# Patient Record
Sex: FEMALE | Race: WHITE
[De-identification: names, ages, dates, MRNs, and addresses within clinical notes are randomized per-mention and may not be internally consistent; named-entity substitution may affect disease eponyms.]

---

## 2018-12-14 ENCOUNTER — HOSPITAL ENCOUNTER (INPATIENT)
Dept: HOSPITAL 8 - ED | Age: 43
LOS: 14 days | Discharge: HOME | DRG: 468 | End: 2018-12-28
Attending: EMERGENCY MEDICINE | Admitting: FAMILY MEDICINE
Payer: COMMERCIAL

## 2018-12-14 VITALS — WEIGHT: 238.1 LBS | HEIGHT: 66 IN | BODY MASS INDEX: 38.27 KG/M2

## 2018-12-14 DIAGNOSIS — M16.11: ICD-10-CM

## 2018-12-14 DIAGNOSIS — Z88.6: ICD-10-CM

## 2018-12-14 DIAGNOSIS — B96.89: ICD-10-CM

## 2018-12-14 DIAGNOSIS — D47.3: ICD-10-CM

## 2018-12-14 DIAGNOSIS — N18.9: ICD-10-CM

## 2018-12-14 DIAGNOSIS — Z88.5: ICD-10-CM

## 2018-12-14 DIAGNOSIS — T84.020A: Primary | ICD-10-CM

## 2018-12-14 DIAGNOSIS — G89.4: ICD-10-CM

## 2018-12-14 DIAGNOSIS — D50.9: ICD-10-CM

## 2018-12-14 DIAGNOSIS — D63.1: ICD-10-CM
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
## 2018-12-14 DIAGNOSIS — Z79.891: ICD-10-CM

## 2018-12-14 DIAGNOSIS — Z88.8: ICD-10-CM
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
## 2018-12-14 DIAGNOSIS — E66.01: ICD-10-CM
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
## 2018-12-14 PROCEDURE — 84443 ASSAY THYROID STIM HORMONE: CPT

## 2018-12-14 PROCEDURE — 76001: CPT

## 2018-12-14 PROCEDURE — C1776 JOINT DEVICE (IMPLANTABLE): HCPCS

## 2018-12-14 PROCEDURE — 36415 COLL VENOUS BLD VENIPUNCTURE: CPT

## 2018-12-14 PROCEDURE — 83550 IRON BINDING TEST: CPT

## 2018-12-14 PROCEDURE — 86923 COMPATIBILITY TEST ELECTRIC: CPT

## 2018-12-14 PROCEDURE — 80053 COMPREHEN METABOLIC PANEL: CPT

## 2018-12-14 PROCEDURE — 85025 COMPLETE CBC W/AUTO DIFF WBC: CPT

## 2018-12-14 PROCEDURE — 83615 LACTATE (LD) (LDH) ENZYME: CPT

## 2018-12-14 PROCEDURE — 82607 VITAMIN B-12: CPT

## 2018-12-14 PROCEDURE — 83540 ASSAY OF IRON: CPT

## 2018-12-14 PROCEDURE — 87205 SMEAR GRAM STAIN: CPT

## 2018-12-14 PROCEDURE — 86900 BLOOD TYPING SEROLOGIC ABO: CPT

## 2018-12-14 PROCEDURE — 80307 DRUG TEST PRSMV CHEM ANLYZR: CPT

## 2018-12-14 PROCEDURE — 73501 X-RAY EXAM HIP UNI 1 VIEW: CPT

## 2018-12-14 PROCEDURE — 82272 OCCULT BLD FECES 1-3 TESTS: CPT

## 2018-12-14 PROCEDURE — 99285 EMERGENCY DEPT VISIT HI MDM: CPT

## 2018-12-14 PROCEDURE — 72170 X-RAY EXAM OF PELVIS: CPT

## 2018-12-14 PROCEDURE — 82728 ASSAY OF FERRITIN: CPT

## 2018-12-14 PROCEDURE — 0SW9XJZ REVISION OF SYNTHETIC SUBSTITUTE IN RIGHT HIP JOINT, EXTERNAL APPROACH: ICD-10-PCS | Performed by: EMERGENCY MEDICINE

## 2018-12-14 PROCEDURE — 76000 FLUOROSCOPY <1 HR PHYS/QHP: CPT

## 2018-12-14 PROCEDURE — 82746 ASSAY OF FOLIC ACID SERUM: CPT

## 2018-12-14 PROCEDURE — 86850 RBC ANTIBODY SCREEN: CPT

## 2018-12-14 PROCEDURE — P9016 RBC LEUKOCYTES REDUCED: HCPCS

## 2018-12-14 PROCEDURE — 85610 PROTHROMBIN TIME: CPT

## 2018-12-14 PROCEDURE — 80048 BASIC METABOLIC PNL TOTAL CA: CPT

## 2018-12-14 PROCEDURE — 83735 ASSAY OF MAGNESIUM: CPT

## 2018-12-14 PROCEDURE — 87070 CULTURE OTHR SPECIMN AEROBIC: CPT

## 2018-12-14 PROCEDURE — 87176 TISSUE HOMOGENIZATION CULTR: CPT

## 2018-12-14 PROCEDURE — C1713 ANCHOR/SCREW BN/BN,TIS/BN: HCPCS

## 2018-12-14 PROCEDURE — 81025 URINE PREGNANCY TEST: CPT

## 2018-12-14 PROCEDURE — 87075 CULTR BACTERIA EXCEPT BLOOD: CPT

## 2018-12-14 PROCEDURE — 27250 TREAT HIP DISLOCATION: CPT

## 2018-12-14 PROCEDURE — 73502 X-RAY EXAM HIP UNI 2-3 VIEWS: CPT

## 2018-12-14 PROCEDURE — 82330 ASSAY OF CALCIUM: CPT

## 2018-12-14 PROCEDURE — G0378 HOSPITAL OBSERVATION PER HR: HCPCS

## 2018-12-14 PROCEDURE — 84702 CHORIONIC GONADOTROPIN TEST: CPT

## 2018-12-15 VITALS — SYSTOLIC BLOOD PRESSURE: 103 MMHG | DIASTOLIC BLOOD PRESSURE: 68 MMHG

## 2018-12-15 VITALS — DIASTOLIC BLOOD PRESSURE: 72 MMHG | SYSTOLIC BLOOD PRESSURE: 123 MMHG

## 2018-12-15 VITALS — SYSTOLIC BLOOD PRESSURE: 103 MMHG | DIASTOLIC BLOOD PRESSURE: 67 MMHG

## 2018-12-15 VITALS — DIASTOLIC BLOOD PRESSURE: 70 MMHG | SYSTOLIC BLOOD PRESSURE: 106 MMHG

## 2018-12-15 RX ADMIN — METHOCARBAMOL PRN MG: 500 TABLET ORAL at 22:41

## 2018-12-15 RX ADMIN — OXYCODONE HYDROCHLORIDE PRN MG: 5 TABLET ORAL at 16:48

## 2018-12-15 RX ADMIN — OXYCODONE HYDROCHLORIDE PRN MG: 5 TABLET ORAL at 04:34

## 2018-12-15 RX ADMIN — OXYCODONE HYDROCHLORIDE PRN MG: 5 TABLET ORAL at 10:51

## 2018-12-15 RX ADMIN — METHOCARBAMOL PRN MG: 500 TABLET ORAL at 16:49

## 2018-12-15 RX ADMIN — ENOXAPARIN SODIUM SCH MG: 40 INJECTION SUBCUTANEOUS at 04:34

## 2018-12-15 RX ADMIN — ASPIRIN SCH MG: 81 TABLET, COATED ORAL at 07:58

## 2018-12-15 RX ADMIN — METHOCARBAMOL PRN MG: 500 TABLET ORAL at 10:51

## 2018-12-15 RX ADMIN — SODIUM CHLORIDE, PRESERVATIVE FREE SCH ML: 5 INJECTION INTRAVENOUS at 20:58

## 2018-12-15 RX ADMIN — DOCUSATE SODIUM 50MG AND SENNOSIDES 8.6MG SCH TAB: 8.6; 5 TABLET, FILM COATED ORAL at 07:58

## 2018-12-15 RX ADMIN — METHOCARBAMOL PRN MG: 500 TABLET ORAL at 04:34

## 2018-12-15 RX ADMIN — CARISOPRODOL SCH MG: 350 TABLET ORAL at 07:58

## 2018-12-15 RX ADMIN — SODIUM CHLORIDE, PRESERVATIVE FREE SCH ML: 5 INJECTION INTRAVENOUS at 07:59

## 2018-12-15 RX ADMIN — DICLOFENAC SODIUM SCH MG: 50 TABLET, DELAYED RELEASE ORAL at 07:58

## 2018-12-15 RX ADMIN — OXYCODONE HYDROCHLORIDE PRN MG: 5 TABLET ORAL at 22:41

## 2018-12-15 RX ADMIN — LIDOCAINE SCH PATCH: 50 PATCH CUTANEOUS at 20:55

## 2018-12-15 RX ADMIN — ONDANSETRON PRN MG: 2 INJECTION, SOLUTION INTRAMUSCULAR; INTRAVENOUS at 22:46

## 2018-12-16 VITALS — DIASTOLIC BLOOD PRESSURE: 56 MMHG | SYSTOLIC BLOOD PRESSURE: 99 MMHG

## 2018-12-16 VITALS — DIASTOLIC BLOOD PRESSURE: 53 MMHG | SYSTOLIC BLOOD PRESSURE: 98 MMHG

## 2018-12-16 VITALS — DIASTOLIC BLOOD PRESSURE: 67 MMHG | SYSTOLIC BLOOD PRESSURE: 101 MMHG

## 2018-12-16 VITALS — DIASTOLIC BLOOD PRESSURE: 79 MMHG | SYSTOLIC BLOOD PRESSURE: 117 MMHG

## 2018-12-16 RX ADMIN — SODIUM CHLORIDE, PRESERVATIVE FREE SCH ML: 5 INJECTION INTRAVENOUS at 21:01

## 2018-12-16 RX ADMIN — METHOCARBAMOL PRN MG: 500 TABLET ORAL at 10:38

## 2018-12-16 RX ADMIN — ONDANSETRON PRN MG: 2 INJECTION, SOLUTION INTRAMUSCULAR; INTRAVENOUS at 16:27

## 2018-12-16 RX ADMIN — GABAPENTIN SCH MG: 100 CAPSULE ORAL at 16:27

## 2018-12-16 RX ADMIN — ASPIRIN SCH MG: 81 TABLET, COATED ORAL at 08:02

## 2018-12-16 RX ADMIN — CARISOPRODOL SCH MG: 350 TABLET ORAL at 09:00

## 2018-12-16 RX ADMIN — ONDANSETRON PRN MG: 2 INJECTION, SOLUTION INTRAMUSCULAR; INTRAVENOUS at 21:01

## 2018-12-16 RX ADMIN — ONDANSETRON PRN MG: 2 INJECTION, SOLUTION INTRAMUSCULAR; INTRAVENOUS at 09:20

## 2018-12-16 RX ADMIN — CARISOPRODOL SCH MG: 350 TABLET ORAL at 21:01

## 2018-12-16 RX ADMIN — METHOCARBAMOL PRN MG: 500 TABLET ORAL at 04:37

## 2018-12-16 RX ADMIN — POLYETHYLENE GLYCOL 3350 PRN GM: 17 POWDER, FOR SOLUTION ORAL at 09:35

## 2018-12-16 RX ADMIN — DICLOFENAC SODIUM SCH MG: 50 TABLET, DELAYED RELEASE ORAL at 08:02

## 2018-12-16 RX ADMIN — METHOCARBAMOL PRN MG: 500 TABLET ORAL at 14:12

## 2018-12-16 RX ADMIN — DOCUSATE SODIUM 50MG AND SENNOSIDES 8.6MG SCH TAB: 8.6; 5 TABLET, FILM COATED ORAL at 08:02

## 2018-12-16 RX ADMIN — OXYCODONE HYDROCHLORIDE PRN MG: 5 TABLET ORAL at 04:37

## 2018-12-16 RX ADMIN — ENOXAPARIN SODIUM SCH MG: 40 INJECTION SUBCUTANEOUS at 04:38

## 2018-12-16 RX ADMIN — DICLOFENAC SODIUM SCH MG: 75 TABLET, DELAYED RELEASE ORAL at 09:00

## 2018-12-16 RX ADMIN — GABAPENTIN SCH MG: 100 CAPSULE ORAL at 09:20

## 2018-12-16 RX ADMIN — SODIUM CHLORIDE, PRESERVATIVE FREE SCH ML: 5 INJECTION INTRAVENOUS at 09:00

## 2018-12-16 RX ADMIN — CARISOPRODOL SCH MG: 350 TABLET ORAL at 08:02

## 2018-12-16 RX ADMIN — DICLOFENAC SODIUM SCH MG: 75 TABLET, DELAYED RELEASE ORAL at 21:00

## 2018-12-16 RX ADMIN — OXYCODONE HYDROCHLORIDE PRN MG: 5 TABLET ORAL at 10:38

## 2018-12-16 RX ADMIN — GABAPENTIN SCH MG: 100 CAPSULE ORAL at 21:00

## 2018-12-16 RX ADMIN — OXYCODONE HYDROCHLORIDE PRN MG: 5 TABLET ORAL at 22:53

## 2018-12-16 RX ADMIN — OXYCODONE HYDROCHLORIDE PRN MG: 5 TABLET ORAL at 16:52

## 2018-12-16 RX ADMIN — LIDOCAINE SCH PATCH: 50 PATCH CUTANEOUS at 09:30

## 2018-12-17 VITALS — DIASTOLIC BLOOD PRESSURE: 54 MMHG | SYSTOLIC BLOOD PRESSURE: 104 MMHG

## 2018-12-17 VITALS — SYSTOLIC BLOOD PRESSURE: 90 MMHG | DIASTOLIC BLOOD PRESSURE: 57 MMHG

## 2018-12-17 VITALS — DIASTOLIC BLOOD PRESSURE: 52 MMHG | SYSTOLIC BLOOD PRESSURE: 97 MMHG

## 2018-12-17 VITALS — DIASTOLIC BLOOD PRESSURE: 47 MMHG | SYSTOLIC BLOOD PRESSURE: 100 MMHG

## 2018-12-17 RX ADMIN — METHOCARBAMOL PRN MG: 500 TABLET ORAL at 00:09

## 2018-12-17 RX ADMIN — OXYCODONE HYDROCHLORIDE PRN MG: 5 TABLET ORAL at 04:44

## 2018-12-17 RX ADMIN — DICLOFENAC SODIUM SCH MG: 75 TABLET, DELAYED RELEASE ORAL at 09:12

## 2018-12-17 RX ADMIN — ONDANSETRON PRN MG: 2 INJECTION, SOLUTION INTRAMUSCULAR; INTRAVENOUS at 16:45

## 2018-12-17 RX ADMIN — DICLOFENAC SODIUM SCH MG: 75 TABLET, DELAYED RELEASE ORAL at 20:49

## 2018-12-17 RX ADMIN — ENOXAPARIN SODIUM SCH MG: 40 INJECTION SUBCUTANEOUS at 04:44

## 2018-12-17 RX ADMIN — ONDANSETRON PRN MG: 2 INJECTION, SOLUTION INTRAMUSCULAR; INTRAVENOUS at 09:12

## 2018-12-17 RX ADMIN — DOCUSATE SODIUM 50MG AND SENNOSIDES 8.6MG SCH TAB: 8.6; 5 TABLET, FILM COATED ORAL at 09:13

## 2018-12-17 RX ADMIN — GABAPENTIN SCH MG: 100 CAPSULE ORAL at 16:46

## 2018-12-17 RX ADMIN — ASPIRIN SCH MG: 81 TABLET, COATED ORAL at 09:13

## 2018-12-17 RX ADMIN — SODIUM CHLORIDE, PRESERVATIVE FREE SCH ML: 5 INJECTION INTRAVENOUS at 20:49

## 2018-12-17 RX ADMIN — POLYETHYLENE GLYCOL 3350 PRN GM: 17 POWDER, FOR SOLUTION ORAL at 09:13

## 2018-12-17 RX ADMIN — LIDOCAINE SCH PATCH: 50 PATCH CUTANEOUS at 09:55

## 2018-12-17 RX ADMIN — OXYCODONE HYDROCHLORIDE PRN MG: 5 TABLET ORAL at 14:19

## 2018-12-17 RX ADMIN — ONDANSETRON PRN MG: 2 INJECTION, SOLUTION INTRAMUSCULAR; INTRAVENOUS at 23:16

## 2018-12-17 RX ADMIN — GABAPENTIN SCH MG: 100 CAPSULE ORAL at 09:12

## 2018-12-17 RX ADMIN — OXYCODONE HYDROCHLORIDE PRN MG: 5 TABLET ORAL at 18:13

## 2018-12-17 RX ADMIN — GABAPENTIN SCH MG: 100 CAPSULE ORAL at 20:49

## 2018-12-17 RX ADMIN — OXYCODONE HYDROCHLORIDE PRN MG: 5 TABLET ORAL at 10:18

## 2018-12-17 RX ADMIN — OXYCODONE HYDROCHLORIDE PRN MG: 5 TABLET ORAL at 22:26

## 2018-12-17 RX ADMIN — METHOCARBAMOL PRN MG: 500 TABLET ORAL at 23:16

## 2018-12-17 RX ADMIN — METHOCARBAMOL PRN MG: 500 TABLET ORAL at 06:01

## 2018-12-17 RX ADMIN — CARISOPRODOL SCH MG: 350 TABLET ORAL at 20:58

## 2018-12-17 RX ADMIN — METHOCARBAMOL PRN MG: 500 TABLET ORAL at 16:48

## 2018-12-17 RX ADMIN — SODIUM CHLORIDE, PRESERVATIVE FREE SCH ML: 5 INJECTION INTRAVENOUS at 10:18

## 2018-12-17 RX ADMIN — CARISOPRODOL SCH MG: 350 TABLET ORAL at 09:19

## 2018-12-18 VITALS — SYSTOLIC BLOOD PRESSURE: 92 MMHG | DIASTOLIC BLOOD PRESSURE: 51 MMHG

## 2018-12-18 VITALS — DIASTOLIC BLOOD PRESSURE: 48 MMHG | SYSTOLIC BLOOD PRESSURE: 98 MMHG

## 2018-12-18 VITALS — DIASTOLIC BLOOD PRESSURE: 64 MMHG | SYSTOLIC BLOOD PRESSURE: 103 MMHG

## 2018-12-18 VITALS — DIASTOLIC BLOOD PRESSURE: 51 MMHG | SYSTOLIC BLOOD PRESSURE: 99 MMHG

## 2018-12-18 RX ADMIN — DICLOFENAC SODIUM SCH MG: 75 TABLET, DELAYED RELEASE ORAL at 19:21

## 2018-12-18 RX ADMIN — SODIUM CHLORIDE, PRESERVATIVE FREE SCH ML: 5 INJECTION INTRAVENOUS at 19:22

## 2018-12-18 RX ADMIN — SODIUM CHLORIDE, PRESERVATIVE FREE SCH ML: 5 INJECTION INTRAVENOUS at 09:00

## 2018-12-18 RX ADMIN — ENOXAPARIN SODIUM SCH MG: 40 INJECTION SUBCUTANEOUS at 11:57

## 2018-12-18 RX ADMIN — METHOCARBAMOL PRN MG: 500 TABLET ORAL at 23:28

## 2018-12-18 RX ADMIN — CARISOPRODOL SCH MG: 350 TABLET ORAL at 07:52

## 2018-12-18 RX ADMIN — OXYCODONE HYDROCHLORIDE PRN MG: 5 TABLET ORAL at 02:49

## 2018-12-18 RX ADMIN — ONDANSETRON PRN MG: 2 INJECTION, SOLUTION INTRAMUSCULAR; INTRAVENOUS at 07:53

## 2018-12-18 RX ADMIN — METHOCARBAMOL PRN MG: 500 TABLET ORAL at 05:57

## 2018-12-18 RX ADMIN — GABAPENTIN SCH MG: 100 CAPSULE ORAL at 07:52

## 2018-12-18 RX ADMIN — DICLOFENAC SODIUM SCH MG: 75 TABLET, DELAYED RELEASE ORAL at 07:52

## 2018-12-18 RX ADMIN — OXYCODONE HYDROCHLORIDE PRN MG: 5 TABLET ORAL at 07:34

## 2018-12-18 RX ADMIN — METHOCARBAMOL PRN MG: 500 TABLET ORAL at 13:56

## 2018-12-18 RX ADMIN — OXYCODONE HYDROCHLORIDE PRN MG: 5 TABLET ORAL at 23:28

## 2018-12-18 RX ADMIN — OXYCODONE HYDROCHLORIDE PRN MG: 5 TABLET ORAL at 11:48

## 2018-12-18 RX ADMIN — ONDANSETRON PRN MG: 4 TABLET, ORALLY DISINTEGRATING ORAL at 16:37

## 2018-12-18 RX ADMIN — OXYCODONE HYDROCHLORIDE PRN MG: 5 TABLET ORAL at 15:20

## 2018-12-18 RX ADMIN — OXYCODONE HYDROCHLORIDE PRN MG: 5 TABLET ORAL at 19:21

## 2018-12-18 RX ADMIN — DOCUSATE SODIUM 50MG AND SENNOSIDES 8.6MG SCH TAB: 8.6; 5 TABLET, FILM COATED ORAL at 09:00

## 2018-12-18 RX ADMIN — CARISOPRODOL SCH MG: 350 TABLET ORAL at 19:21

## 2018-12-18 RX ADMIN — GABAPENTIN SCH MG: 100 CAPSULE ORAL at 16:37

## 2018-12-18 RX ADMIN — ASPIRIN SCH MG: 81 TABLET, COATED ORAL at 07:52

## 2018-12-18 RX ADMIN — LIDOCAINE SCH PATCH: 50 PATCH CUTANEOUS at 13:56

## 2018-12-18 RX ADMIN — GABAPENTIN SCH MG: 100 CAPSULE ORAL at 19:21

## 2018-12-19 VITALS — DIASTOLIC BLOOD PRESSURE: 56 MMHG | SYSTOLIC BLOOD PRESSURE: 105 MMHG

## 2018-12-19 VITALS — SYSTOLIC BLOOD PRESSURE: 107 MMHG | DIASTOLIC BLOOD PRESSURE: 68 MMHG

## 2018-12-19 VITALS — DIASTOLIC BLOOD PRESSURE: 62 MMHG | SYSTOLIC BLOOD PRESSURE: 109 MMHG

## 2018-12-19 VITALS — DIASTOLIC BLOOD PRESSURE: 51 MMHG | SYSTOLIC BLOOD PRESSURE: 97 MMHG

## 2018-12-19 VITALS — SYSTOLIC BLOOD PRESSURE: 102 MMHG | DIASTOLIC BLOOD PRESSURE: 51 MMHG

## 2018-12-19 RX ADMIN — ASPIRIN SCH MG: 81 TABLET, COATED ORAL at 07:33

## 2018-12-19 RX ADMIN — SODIUM CHLORIDE, PRESERVATIVE FREE SCH ML: 5 INJECTION INTRAVENOUS at 21:00

## 2018-12-19 RX ADMIN — OXYCODONE HYDROCHLORIDE PRN MG: 5 TABLET ORAL at 03:43

## 2018-12-19 RX ADMIN — METHOCARBAMOL PRN MG: 500 TABLET ORAL at 15:20

## 2018-12-19 RX ADMIN — METHOCARBAMOL PRN MG: 500 TABLET ORAL at 21:12

## 2018-12-19 RX ADMIN — DOCUSATE SODIUM 50MG AND SENNOSIDES 8.6MG SCH TAB: 8.6; 5 TABLET, FILM COATED ORAL at 07:33

## 2018-12-19 RX ADMIN — DICLOFENAC SODIUM SCH MG: 75 TABLET, DELAYED RELEASE ORAL at 07:33

## 2018-12-19 RX ADMIN — OXYCODONE HYDROCHLORIDE PRN MG: 5 TABLET ORAL at 19:26

## 2018-12-19 RX ADMIN — ONDANSETRON PRN MG: 4 TABLET, ORALLY DISINTEGRATING ORAL at 15:56

## 2018-12-19 RX ADMIN — GABAPENTIN SCH MG: 100 CAPSULE ORAL at 15:54

## 2018-12-19 RX ADMIN — CARISOPRODOL SCH MG: 350 TABLET ORAL at 21:05

## 2018-12-19 RX ADMIN — OXYCODONE HYDROCHLORIDE PRN MG: 5 TABLET ORAL at 07:34

## 2018-12-19 RX ADMIN — SODIUM CHLORIDE, PRESERVATIVE FREE SCH ML: 5 INJECTION INTRAVENOUS at 09:38

## 2018-12-19 RX ADMIN — GABAPENTIN SCH MG: 100 CAPSULE ORAL at 06:49

## 2018-12-19 RX ADMIN — ONDANSETRON PRN MG: 4 TABLET, ORALLY DISINTEGRATING ORAL at 06:49

## 2018-12-19 RX ADMIN — LIDOCAINE SCH PATCH: 50 PATCH CUTANEOUS at 21:13

## 2018-12-19 RX ADMIN — OXYCODONE HYDROCHLORIDE PRN MG: 5 TABLET ORAL at 11:16

## 2018-12-19 RX ADMIN — CARISOPRODOL SCH MG: 350 TABLET ORAL at 09:38

## 2018-12-19 RX ADMIN — GABAPENTIN SCH MG: 100 CAPSULE ORAL at 21:05

## 2018-12-19 RX ADMIN — METHOCARBAMOL PRN MG: 500 TABLET ORAL at 03:43

## 2018-12-19 RX ADMIN — OXYCODONE HYDROCHLORIDE PRN MG: 5 TABLET ORAL at 23:37

## 2018-12-19 RX ADMIN — DICLOFENAC SODIUM SCH MG: 75 TABLET, DELAYED RELEASE ORAL at 21:05

## 2018-12-19 RX ADMIN — OXYCODONE HYDROCHLORIDE PRN MG: 5 TABLET ORAL at 15:20

## 2018-12-19 RX ADMIN — ENOXAPARIN SODIUM SCH MG: 40 INJECTION SUBCUTANEOUS at 12:40

## 2018-12-19 RX ADMIN — ONDANSETRON PRN MG: 4 TABLET, ORALLY DISINTEGRATING ORAL at 21:05

## 2018-12-20 VITALS — SYSTOLIC BLOOD PRESSURE: 106 MMHG | DIASTOLIC BLOOD PRESSURE: 62 MMHG

## 2018-12-20 VITALS — DIASTOLIC BLOOD PRESSURE: 66 MMHG | SYSTOLIC BLOOD PRESSURE: 112 MMHG

## 2018-12-20 VITALS — DIASTOLIC BLOOD PRESSURE: 58 MMHG | SYSTOLIC BLOOD PRESSURE: 98 MMHG

## 2018-12-20 VITALS — DIASTOLIC BLOOD PRESSURE: 66 MMHG | SYSTOLIC BLOOD PRESSURE: 104 MMHG

## 2018-12-20 RX ADMIN — DICLOFENAC SODIUM SCH MG: 75 TABLET, DELAYED RELEASE ORAL at 20:46

## 2018-12-20 RX ADMIN — CARISOPRODOL SCH MG: 350 TABLET ORAL at 08:28

## 2018-12-20 RX ADMIN — OXYCODONE HYDROCHLORIDE PRN MG: 5 TABLET ORAL at 11:38

## 2018-12-20 RX ADMIN — GABAPENTIN SCH MG: 100 CAPSULE ORAL at 08:28

## 2018-12-20 RX ADMIN — GABAPENTIN SCH MG: 100 CAPSULE ORAL at 20:46

## 2018-12-20 RX ADMIN — OXYCODONE HYDROCHLORIDE PRN MG: 5 TABLET ORAL at 03:13

## 2018-12-20 RX ADMIN — OXYCODONE HYDROCHLORIDE PRN MG: 5 TABLET ORAL at 23:18

## 2018-12-20 RX ADMIN — OXYCODONE HYDROCHLORIDE PRN MG: 5 TABLET ORAL at 07:26

## 2018-12-20 RX ADMIN — METHOCARBAMOL PRN MG: 500 TABLET ORAL at 15:32

## 2018-12-20 RX ADMIN — DICLOFENAC SODIUM SCH MG: 75 TABLET, DELAYED RELEASE ORAL at 08:28

## 2018-12-20 RX ADMIN — ONDANSETRON PRN MG: 4 TABLET, ORALLY DISINTEGRATING ORAL at 20:51

## 2018-12-20 RX ADMIN — LIDOCAINE SCH PATCH: 50 PATCH CUTANEOUS at 20:47

## 2018-12-20 RX ADMIN — ONDANSETRON PRN MG: 4 TABLET, ORALLY DISINTEGRATING ORAL at 15:32

## 2018-12-20 RX ADMIN — GABAPENTIN SCH MG: 100 CAPSULE ORAL at 15:32

## 2018-12-20 RX ADMIN — ONDANSETRON PRN MG: 4 TABLET, ORALLY DISINTEGRATING ORAL at 08:28

## 2018-12-20 RX ADMIN — METHOCARBAMOL PRN MG: 500 TABLET ORAL at 09:44

## 2018-12-20 RX ADMIN — METHOCARBAMOL PRN MG: 500 TABLET ORAL at 03:13

## 2018-12-20 RX ADMIN — SODIUM CHLORIDE, PRESERVATIVE FREE SCH ML: 5 INJECTION INTRAVENOUS at 09:48

## 2018-12-20 RX ADMIN — SODIUM CHLORIDE, PRESERVATIVE FREE SCH ML: 5 INJECTION INTRAVENOUS at 20:47

## 2018-12-20 RX ADMIN — ASPIRIN SCH MG: 81 TABLET, COATED ORAL at 08:28

## 2018-12-20 RX ADMIN — OXYCODONE HYDROCHLORIDE PRN MG: 5 TABLET ORAL at 15:01

## 2018-12-20 RX ADMIN — ENOXAPARIN SODIUM SCH MG: 40 INJECTION SUBCUTANEOUS at 11:49

## 2018-12-20 RX ADMIN — METHOCARBAMOL PRN MG: 500 TABLET ORAL at 21:33

## 2018-12-20 RX ADMIN — DOCUSATE SODIUM 50MG AND SENNOSIDES 8.6MG SCH TAB: 8.6; 5 TABLET, FILM COATED ORAL at 08:28

## 2018-12-20 RX ADMIN — CARISOPRODOL SCH MG: 350 TABLET ORAL at 20:46

## 2018-12-21 VITALS — SYSTOLIC BLOOD PRESSURE: 106 MMHG | DIASTOLIC BLOOD PRESSURE: 66 MMHG

## 2018-12-21 VITALS — SYSTOLIC BLOOD PRESSURE: 98 MMHG | DIASTOLIC BLOOD PRESSURE: 59 MMHG

## 2018-12-21 VITALS — SYSTOLIC BLOOD PRESSURE: 104 MMHG | DIASTOLIC BLOOD PRESSURE: 55 MMHG

## 2018-12-21 VITALS — DIASTOLIC BLOOD PRESSURE: 60 MMHG | SYSTOLIC BLOOD PRESSURE: 98 MMHG

## 2018-12-21 LAB
<PLATELET ESTIMATE>: (no result)
<PLT MORPHOLOGY>: (no result)
ANION GAP SERPL CALC-SCNC: 7 MMOL/L (ref 5–15)
ANISOCYTOSIS BLD QL SMEAR: (no result)
BAND#(MANUAL): 0.09 X10^3/UL
BASOPHILS NFR BLD MANUAL: 1 % (ref 0–1)
BASOS#(MANUAL): 0.09 X10^3/UL (ref 0–0.1)
CALCIUM SERPL-MCNC: 8.1 MG/DL (ref 8.5–10.1)
CHLORIDE SERPL-SCNC: 106 MMOL/L (ref 98–107)
CREAT SERPL-MCNC: 0.52 MG/DL (ref 0.55–1.02)
EOS#(MANUAL): 0.61 X10^3/UL (ref 0–0.4)
EOS% (MANUAL): 7 % (ref 1–7)
ERYTHROCYTE [DISTWIDTH] IN BLOOD BY AUTOMATED COUNT: 31 % (ref 9.6–15.2)
HCG UR SG: 1.02 (ref 1–1.03)
HYPOCHROMIA BLD QL SMEAR: (no result)
INR PPP: 1.02 (ref 0.93–1.1)
LYMPH#(MANUAL): 1.91 X10^3/UL (ref 1–3.4)
LYMPHS% (MANUAL): 22 % (ref 22–44)
MCH RBC QN AUTO: 20.1 PG (ref 27–34.8)
MCHC RBC AUTO-ENTMCNC: 30.6 G/DL (ref 32.4–35.8)
MCV RBC AUTO: 65.5 FL (ref 80–100)
MD: YES
MICROCYTES BLD QL SMEAR: (no result)
MONOS#(MANUAL): 0.09 X10^3/UL (ref 0.3–2.7)
MONOS% (MANUAL): 1 % (ref 2–9)
NEUTS BAND NFR BLD: 1 % (ref 0–7)
OVALOCYTES BLD QL SMEAR: (no result)
PLATELET # BLD AUTO: 583 X10^3/UL (ref 130–400)
PMV BLD AUTO: 8 FL (ref 7.4–10.4)
POLYCHROMASIA BLD QL SMEAR: (no result)
PROTHROMBIN TIME: 10.8 SECONDS (ref 9.6–11.5)
RBC # BLD AUTO: 3.97 X10^6/UL (ref 3.82–5.3)
SEG#(MANUAL): 5.92 X10^3/UL (ref 1.8–6.8)
SEGS% (MANUAL): 68 % (ref 42–75)

## 2018-12-21 PROCEDURE — 0SW9XJZ REVISION OF SYNTHETIC SUBSTITUTE IN RIGHT HIP JOINT, EXTERNAL APPROACH: ICD-10-PCS | Performed by: ORTHOPAEDIC SURGERY

## 2018-12-21 RX ADMIN — OXYCODONE HYDROCHLORIDE PRN MG: 5 TABLET ORAL at 03:36

## 2018-12-21 RX ADMIN — OXYCODONE HYDROCHLORIDE PRN MG: 5 TABLET ORAL at 21:00

## 2018-12-21 RX ADMIN — SODIUM CHLORIDE, PRESERVATIVE FREE SCH ML: 5 INJECTION INTRAVENOUS at 09:29

## 2018-12-21 RX ADMIN — OXYCODONE HYDROCHLORIDE PRN MG: 5 TABLET ORAL at 12:16

## 2018-12-21 RX ADMIN — HYDROMORPHONE HYDROCHLORIDE PRN MG: 2 INJECTION INTRAMUSCULAR; INTRAVENOUS; SUBCUTANEOUS at 18:40

## 2018-12-21 RX ADMIN — DICLOFENAC SODIUM SCH MG: 75 TABLET, DELAYED RELEASE ORAL at 09:21

## 2018-12-21 RX ADMIN — ENOXAPARIN SODIUM SCH MG: 40 INJECTION SUBCUTANEOUS at 11:59

## 2018-12-21 RX ADMIN — OXYCODONE HYDROCHLORIDE PRN MG: 5 TABLET ORAL at 17:12

## 2018-12-21 RX ADMIN — DOCUSATE SODIUM 50MG AND SENNOSIDES 8.6MG SCH TAB: 8.6; 5 TABLET, FILM COATED ORAL at 09:22

## 2018-12-21 RX ADMIN — ONDANSETRON PRN MG: 4 TABLET, ORALLY DISINTEGRATING ORAL at 22:17

## 2018-12-21 RX ADMIN — METHOCARBAMOL PRN MG: 500 TABLET ORAL at 21:00

## 2018-12-21 RX ADMIN — ASPIRIN SCH MG: 81 TABLET, COATED ORAL at 21:00

## 2018-12-21 RX ADMIN — CARISOPRODOL SCH MG: 350 TABLET ORAL at 09:22

## 2018-12-21 RX ADMIN — GABAPENTIN SCH MG: 100 CAPSULE ORAL at 09:22

## 2018-12-21 RX ADMIN — ASPIRIN SCH MG: 81 TABLET, COATED ORAL at 09:22

## 2018-12-21 RX ADMIN — DICLOFENAC SODIUM SCH MG: 75 TABLET, DELAYED RELEASE ORAL at 22:17

## 2018-12-21 RX ADMIN — OXYCODONE HYDROCHLORIDE PRN MG: 5 TABLET ORAL at 07:43

## 2018-12-21 RX ADMIN — HYDROMORPHONE HYDROCHLORIDE PRN MG: 2 INJECTION INTRAMUSCULAR; INTRAVENOUS; SUBCUTANEOUS at 19:30

## 2018-12-21 RX ADMIN — CARISOPRODOL SCH MG: 350 TABLET ORAL at 22:17

## 2018-12-21 RX ADMIN — LIDOCAINE SCH PATCH: 50 PATCH CUTANEOUS at 22:20

## 2018-12-21 RX ADMIN — GABAPENTIN SCH MG: 100 CAPSULE ORAL at 15:52

## 2018-12-21 RX ADMIN — HYDROMORPHONE HYDROCHLORIDE PRN MG: 2 INJECTION INTRAMUSCULAR; INTRAVENOUS; SUBCUTANEOUS at 19:00

## 2018-12-21 RX ADMIN — METHOCARBAMOL PRN MG: 500 TABLET ORAL at 03:36

## 2018-12-21 RX ADMIN — METHOCARBAMOL PRN MG: 500 TABLET ORAL at 09:21

## 2018-12-21 RX ADMIN — ONDANSETRON PRN MG: 4 TABLET, ORALLY DISINTEGRATING ORAL at 09:22

## 2018-12-21 RX ADMIN — GABAPENTIN SCH MG: 100 CAPSULE ORAL at 22:17

## 2018-12-21 RX ADMIN — SODIUM CHLORIDE, PRESERVATIVE FREE SCH ML: 5 INJECTION INTRAVENOUS at 22:22

## 2018-12-21 RX ADMIN — HYDROMORPHONE HYDROCHLORIDE PRN MG: 2 INJECTION INTRAMUSCULAR; INTRAVENOUS; SUBCUTANEOUS at 18:30

## 2018-12-22 VITALS — DIASTOLIC BLOOD PRESSURE: 51 MMHG | SYSTOLIC BLOOD PRESSURE: 85 MMHG

## 2018-12-22 VITALS — DIASTOLIC BLOOD PRESSURE: 55 MMHG | SYSTOLIC BLOOD PRESSURE: 94 MMHG

## 2018-12-22 VITALS — SYSTOLIC BLOOD PRESSURE: 100 MMHG | DIASTOLIC BLOOD PRESSURE: 64 MMHG

## 2018-12-22 VITALS — SYSTOLIC BLOOD PRESSURE: 101 MMHG | DIASTOLIC BLOOD PRESSURE: 60 MMHG

## 2018-12-22 VITALS — DIASTOLIC BLOOD PRESSURE: 67 MMHG | SYSTOLIC BLOOD PRESSURE: 104 MMHG

## 2018-12-22 RX ADMIN — ASPIRIN SCH MG: 81 TABLET, COATED ORAL at 21:17

## 2018-12-22 RX ADMIN — DICLOFENAC SODIUM SCH MG: 75 TABLET, DELAYED RELEASE ORAL at 09:40

## 2018-12-22 RX ADMIN — ONDANSETRON PRN MG: 4 TABLET, ORALLY DISINTEGRATING ORAL at 16:12

## 2018-12-22 RX ADMIN — SODIUM CHLORIDE, PRESERVATIVE FREE SCH ML: 5 INJECTION INTRAVENOUS at 09:43

## 2018-12-22 RX ADMIN — ONDANSETRON PRN MG: 4 TABLET, ORALLY DISINTEGRATING ORAL at 21:39

## 2018-12-22 RX ADMIN — OXYCODONE HYDROCHLORIDE PRN MG: 5 TABLET ORAL at 21:17

## 2018-12-22 RX ADMIN — DOCUSATE SODIUM 50MG AND SENNOSIDES 8.6MG SCH TAB: 8.6; 5 TABLET, FILM COATED ORAL at 09:43

## 2018-12-22 RX ADMIN — LIDOCAINE SCH PATCH: 50 PATCH CUTANEOUS at 21:18

## 2018-12-22 RX ADMIN — METHOCARBAMOL PRN MG: 500 TABLET ORAL at 04:18

## 2018-12-22 RX ADMIN — CARISOPRODOL SCH MG: 350 TABLET ORAL at 09:41

## 2018-12-22 RX ADMIN — GABAPENTIN SCH MG: 100 CAPSULE ORAL at 09:41

## 2018-12-22 RX ADMIN — OXYCODONE HYDROCHLORIDE PRN MG: 5 TABLET ORAL at 05:17

## 2018-12-22 RX ADMIN — ONDANSETRON PRN MG: 4 TABLET, ORALLY DISINTEGRATING ORAL at 09:41

## 2018-12-22 RX ADMIN — GABAPENTIN SCH MG: 100 CAPSULE ORAL at 16:12

## 2018-12-22 RX ADMIN — GABAPENTIN SCH MG: 100 CAPSULE ORAL at 21:18

## 2018-12-22 RX ADMIN — DICLOFENAC SODIUM SCH MG: 75 TABLET, DELAYED RELEASE ORAL at 21:17

## 2018-12-22 RX ADMIN — SODIUM CHLORIDE, PRESERVATIVE FREE SCH ML: 5 INJECTION INTRAVENOUS at 21:00

## 2018-12-22 RX ADMIN — ASPIRIN SCH MG: 81 TABLET, COATED ORAL at 09:41

## 2018-12-22 RX ADMIN — OXYCODONE HYDROCHLORIDE SCH MG: 10 TABLET, FILM COATED, EXTENDED RELEASE ORAL at 16:12

## 2018-12-22 RX ADMIN — OXYCODONE HYDROCHLORIDE PRN MG: 5 TABLET ORAL at 13:20

## 2018-12-22 RX ADMIN — METHOCARBAMOL PRN MG: 500 TABLET ORAL at 12:35

## 2018-12-22 RX ADMIN — CARISOPRODOL SCH MG: 350 TABLET ORAL at 21:17

## 2018-12-22 RX ADMIN — OXYCODONE HYDROCHLORIDE PRN MG: 5 TABLET ORAL at 01:14

## 2018-12-22 RX ADMIN — OXYCODONE HYDROCHLORIDE PRN MG: 5 TABLET ORAL at 09:41

## 2018-12-23 VITALS — DIASTOLIC BLOOD PRESSURE: 71 MMHG | SYSTOLIC BLOOD PRESSURE: 105 MMHG

## 2018-12-23 VITALS — DIASTOLIC BLOOD PRESSURE: 80 MMHG | SYSTOLIC BLOOD PRESSURE: 115 MMHG

## 2018-12-23 VITALS — DIASTOLIC BLOOD PRESSURE: 69 MMHG | SYSTOLIC BLOOD PRESSURE: 100 MMHG

## 2018-12-23 VITALS — DIASTOLIC BLOOD PRESSURE: 48 MMHG | SYSTOLIC BLOOD PRESSURE: 100 MMHG

## 2018-12-23 LAB
% IRON SATURATION: 4 % (ref 20–55)
<PLATELET ESTIMATE>: (no result)
<PLT MORPHOLOGY>: (no result)
ANISOCYTOSIS BLD QL SMEAR: (no result)
EOS#(MANUAL): 0.09 X10^3/UL (ref 0–0.4)
EOS% (MANUAL): 1 % (ref 1–7)
ERYTHROCYTE [DISTWIDTH] IN BLOOD BY AUTOMATED COUNT: 29.3 % (ref 9.6–15.2)
FOLATE SERPL-MCNC: 11.2 NG/ML (ref 3.1–17.5)
GFR SERPL CREATININE-BSD FRML MDRD: NEGATIVE ML/MIN/{1.73_M2}
HCG UR SG: 1 (ref 1–1.03)
HYPOCHROMIA BLD QL SMEAR: (no result)
IRON LEVEL: 15 MCG/DL (ref 50–170)
LYMPH#(MANUAL): 2.46 X10^3/UL (ref 1–3.4)
LYMPHS% (MANUAL): 28 % (ref 22–44)
MACROCYTES BLD QL SMEAR: (no result)
MCH RBC QN AUTO: 19.9 PG (ref 27–34.8)
MCHC RBC AUTO-ENTMCNC: 30.4 G/DL (ref 32.4–35.8)
MCV RBC AUTO: 65.3 FL (ref 80–100)
MD: YES
MICROCYTES BLD QL SMEAR: (no result)
MONOS#(MANUAL): 0.7 X10^3/UL (ref 0.3–2.7)
MONOS% (MANUAL): 8 % (ref 2–9)
PLATELET # BLD AUTO: 810 X10^3/UL (ref 130–400)
PMV BLD AUTO: 8.3 FL (ref 7.4–10.4)
RBC # BLD AUTO: 4.1 X10^6/UL (ref 3.82–5.3)
SEG#(MANUAL): 5.54 X10^3/UL (ref 1.8–6.8)
SEGS% (MANUAL): 63 % (ref 42–75)
TIBC SERPL-MCNC: 371 MCG/DL (ref 250–450)
TSH SERPL-ACNC: 1.84 MIU/L (ref 0.36–3.74)

## 2018-12-23 RX ADMIN — CARISOPRODOL SCH MG: 350 TABLET ORAL at 20:18

## 2018-12-23 RX ADMIN — DICLOFENAC SODIUM SCH MG: 75 TABLET, DELAYED RELEASE ORAL at 20:18

## 2018-12-23 RX ADMIN — ASPIRIN SCH MG: 81 TABLET, COATED ORAL at 08:23

## 2018-12-23 RX ADMIN — METHOCARBAMOL PRN MG: 500 TABLET ORAL at 04:17

## 2018-12-23 RX ADMIN — OXYCODONE HYDROCHLORIDE PRN MG: 5 TABLET ORAL at 16:13

## 2018-12-23 RX ADMIN — ASPIRIN SCH MG: 81 TABLET, COATED ORAL at 20:18

## 2018-12-23 RX ADMIN — GABAPENTIN SCH MG: 100 CAPSULE ORAL at 16:13

## 2018-12-23 RX ADMIN — LIDOCAINE SCH PATCH: 50 PATCH CUTANEOUS at 20:49

## 2018-12-23 RX ADMIN — SODIUM CHLORIDE, PRESERVATIVE FREE SCH ML: 5 INJECTION INTRAVENOUS at 08:23

## 2018-12-23 RX ADMIN — DICLOFENAC SODIUM SCH MG: 75 TABLET, DELAYED RELEASE ORAL at 08:23

## 2018-12-23 RX ADMIN — OXYCODONE HYDROCHLORIDE PRN MG: 5 TABLET ORAL at 06:17

## 2018-12-23 RX ADMIN — ENOXAPARIN SODIUM SCH MG: 40 INJECTION SUBCUTANEOUS at 08:28

## 2018-12-23 RX ADMIN — METHOCARBAMOL PRN MG: 500 TABLET ORAL at 13:52

## 2018-12-23 RX ADMIN — OXYCODONE HYDROCHLORIDE SCH MG: 10 TABLET, FILM COATED, EXTENDED RELEASE ORAL at 04:17

## 2018-12-23 RX ADMIN — SODIUM CHLORIDE, PRESERVATIVE FREE SCH ML: 5 INJECTION INTRAVENOUS at 20:18

## 2018-12-23 RX ADMIN — CARISOPRODOL SCH MG: 350 TABLET ORAL at 08:24

## 2018-12-23 RX ADMIN — OXYCODONE HYDROCHLORIDE PRN MG: 5 TABLET ORAL at 12:11

## 2018-12-23 RX ADMIN — OXYCODONE HYDROCHLORIDE PRN MG: 5 TABLET ORAL at 20:18

## 2018-12-23 RX ADMIN — GABAPENTIN SCH MG: 100 CAPSULE ORAL at 20:18

## 2018-12-23 RX ADMIN — GABAPENTIN SCH MG: 100 CAPSULE ORAL at 08:23

## 2018-12-23 RX ADMIN — DOCUSATE SODIUM 50MG AND SENNOSIDES 8.6MG SCH TAB: 8.6; 5 TABLET, FILM COATED ORAL at 08:23

## 2018-12-23 RX ADMIN — ONDANSETRON PRN MG: 4 TABLET, ORALLY DISINTEGRATING ORAL at 04:17

## 2018-12-23 RX ADMIN — ONDANSETRON PRN MG: 4 TABLET, ORALLY DISINTEGRATING ORAL at 16:13

## 2018-12-24 VITALS — SYSTOLIC BLOOD PRESSURE: 88 MMHG | DIASTOLIC BLOOD PRESSURE: 58 MMHG

## 2018-12-24 VITALS — DIASTOLIC BLOOD PRESSURE: 58 MMHG | SYSTOLIC BLOOD PRESSURE: 95 MMHG

## 2018-12-24 VITALS — SYSTOLIC BLOOD PRESSURE: 88 MMHG | DIASTOLIC BLOOD PRESSURE: 53 MMHG

## 2018-12-24 VITALS — DIASTOLIC BLOOD PRESSURE: 52 MMHG | SYSTOLIC BLOOD PRESSURE: 104 MMHG

## 2018-12-24 RX ADMIN — ONDANSETRON PRN MG: 4 TABLET, ORALLY DISINTEGRATING ORAL at 17:07

## 2018-12-24 RX ADMIN — GABAPENTIN SCH MG: 100 CAPSULE ORAL at 08:49

## 2018-12-24 RX ADMIN — DICLOFENAC SODIUM SCH MG: 75 TABLET, DELAYED RELEASE ORAL at 08:48

## 2018-12-24 RX ADMIN — CARISOPRODOL SCH MG: 350 TABLET ORAL at 08:51

## 2018-12-24 RX ADMIN — METHOCARBAMOL PRN MG: 500 TABLET ORAL at 18:47

## 2018-12-24 RX ADMIN — SODIUM CHLORIDE, PRESERVATIVE FREE SCH ML: 5 INJECTION INTRAVENOUS at 08:48

## 2018-12-24 RX ADMIN — ONDANSETRON PRN MG: 4 TABLET, ORALLY DISINTEGRATING ORAL at 08:59

## 2018-12-24 RX ADMIN — ENOXAPARIN SODIUM SCH MG: 40 INJECTION SUBCUTANEOUS at 08:47

## 2018-12-24 RX ADMIN — GABAPENTIN SCH MG: 100 CAPSULE ORAL at 17:06

## 2018-12-24 RX ADMIN — OXYCODONE HYDROCHLORIDE PRN MG: 5 TABLET ORAL at 18:47

## 2018-12-24 RX ADMIN — METHOCARBAMOL PRN MG: 500 TABLET ORAL at 00:16

## 2018-12-24 RX ADMIN — OXYCODONE HYDROCHLORIDE PRN MG: 5 TABLET ORAL at 10:39

## 2018-12-24 RX ADMIN — SODIUM CHLORIDE, PRESERVATIVE FREE SCH ML: 5 INJECTION INTRAVENOUS at 21:00

## 2018-12-24 RX ADMIN — DICLOFENAC SODIUM SCH MG: 75 TABLET, DELAYED RELEASE ORAL at 20:32

## 2018-12-24 RX ADMIN — FERROUS SULFATE TAB 325 MG (65 MG ELEMENTAL FE) SCH MG: 325 (65 FE) TAB at 17:07

## 2018-12-24 RX ADMIN — OXYCODONE HYDROCHLORIDE PRN MG: 5 TABLET ORAL at 23:03

## 2018-12-24 RX ADMIN — METHOCARBAMOL PRN MG: 500 TABLET ORAL at 12:37

## 2018-12-24 RX ADMIN — OXYCODONE HYDROCHLORIDE PRN MG: 5 TABLET ORAL at 00:16

## 2018-12-24 RX ADMIN — CARISOPRODOL SCH MG: 350 TABLET ORAL at 20:32

## 2018-12-24 RX ADMIN — METHOCARBAMOL PRN MG: 500 TABLET ORAL at 06:28

## 2018-12-24 RX ADMIN — ASPIRIN SCH MG: 81 TABLET, COATED ORAL at 20:32

## 2018-12-24 RX ADMIN — OXYCODONE HYDROCHLORIDE PRN MG: 5 TABLET ORAL at 06:28

## 2018-12-24 RX ADMIN — GABAPENTIN SCH MG: 100 CAPSULE ORAL at 20:32

## 2018-12-24 RX ADMIN — ASPIRIN SCH MG: 81 TABLET, COATED ORAL at 08:48

## 2018-12-24 RX ADMIN — OXYCODONE HYDROCHLORIDE PRN MG: 5 TABLET ORAL at 14:53

## 2018-12-24 RX ADMIN — LIDOCAINE SCH PATCH: 50 PATCH CUTANEOUS at 20:33

## 2018-12-24 RX ADMIN — DOCUSATE SODIUM 50MG AND SENNOSIDES 8.6MG SCH TAB: 8.6; 5 TABLET, FILM COATED ORAL at 08:48

## 2018-12-25 VITALS — SYSTOLIC BLOOD PRESSURE: 104 MMHG | DIASTOLIC BLOOD PRESSURE: 75 MMHG

## 2018-12-25 VITALS — SYSTOLIC BLOOD PRESSURE: 96 MMHG | DIASTOLIC BLOOD PRESSURE: 66 MMHG

## 2018-12-25 VITALS — SYSTOLIC BLOOD PRESSURE: 96 MMHG | DIASTOLIC BLOOD PRESSURE: 61 MMHG

## 2018-12-25 VITALS — DIASTOLIC BLOOD PRESSURE: 61 MMHG | SYSTOLIC BLOOD PRESSURE: 93 MMHG

## 2018-12-25 VITALS — SYSTOLIC BLOOD PRESSURE: 107 MMHG | DIASTOLIC BLOOD PRESSURE: 70 MMHG

## 2018-12-25 VITALS — DIASTOLIC BLOOD PRESSURE: 60 MMHG | SYSTOLIC BLOOD PRESSURE: 98 MMHG

## 2018-12-25 VITALS — DIASTOLIC BLOOD PRESSURE: 61 MMHG | SYSTOLIC BLOOD PRESSURE: 95 MMHG

## 2018-12-25 VITALS — SYSTOLIC BLOOD PRESSURE: 100 MMHG | DIASTOLIC BLOOD PRESSURE: 64 MMHG

## 2018-12-25 LAB
<PLATELET ESTIMATE>: (no result)
<PLT MORPHOLOGY>: (no result)
ANISOCYTOSIS BLD QL SMEAR: (no result)
BASOPHILS NFR BLD MANUAL: 2 % (ref 0–1)
BASOS#(MANUAL): 0.18 X10^3/UL (ref 0–0.1)
EOS#(MANUAL): 0.36 X10^3/UL (ref 0–0.4)
EOS% (MANUAL): 4 % (ref 1–7)
ERYTHROCYTE [DISTWIDTH] IN BLOOD BY AUTOMATED COUNT: 30.4 % (ref 9.6–15.2)
HYPOCHROMIA BLD QL SMEAR: (no result)
LYMPH#(MANUAL): 3.06 X10^3/UL (ref 1–3.4)
LYMPHS% (MANUAL): 34 % (ref 22–44)
MACROCYTES BLD QL SMEAR: (no result)
MCH RBC QN AUTO: 20 PG (ref 27–34.8)
MCHC RBC AUTO-ENTMCNC: 29.7 G/DL (ref 32.4–35.8)
MCV RBC AUTO: 67.4 FL (ref 80–100)
MD: YES
MICROCYTES BLD QL SMEAR: (no result)
MONOS#(MANUAL): 0.36 X10^3/UL (ref 0.3–2.7)
MONOS% (MANUAL): 4 % (ref 2–9)
OVALOCYTES BLD QL SMEAR: (no result)
PLATELET # BLD AUTO: 722 X10^3/UL (ref 130–400)
PMV BLD AUTO: 8.2 FL (ref 7.4–10.4)
RBC # BLD AUTO: 3.71 X10^6/UL (ref 3.82–5.3)
SEG#(MANUAL): 5.04 X10^3/UL (ref 1.8–6.8)
SEGS% (MANUAL): 56 % (ref 42–75)

## 2018-12-25 PROCEDURE — 30233N1 TRANSFUSION OF NONAUTOLOGOUS RED BLOOD CELLS INTO PERIPHERAL VEIN, PERCUTANEOUS APPROACH: ICD-10-PCS | Performed by: HOSPITALIST

## 2018-12-25 RX ADMIN — OXYCODONE HYDROCHLORIDE PRN MG: 5 TABLET ORAL at 07:04

## 2018-12-25 RX ADMIN — OXYCODONE HYDROCHLORIDE PRN MG: 5 TABLET ORAL at 03:14

## 2018-12-25 RX ADMIN — METHOCARBAMOL PRN MG: 500 TABLET ORAL at 13:36

## 2018-12-25 RX ADMIN — DICLOFENAC SODIUM SCH MG: 75 TABLET, DELAYED RELEASE ORAL at 21:30

## 2018-12-25 RX ADMIN — CARISOPRODOL SCH MG: 350 TABLET ORAL at 21:30

## 2018-12-25 RX ADMIN — FERROUS SULFATE TAB 325 MG (65 MG ELEMENTAL FE) SCH MG: 325 (65 FE) TAB at 16:11

## 2018-12-25 RX ADMIN — GABAPENTIN SCH MG: 100 CAPSULE ORAL at 16:11

## 2018-12-25 RX ADMIN — GABAPENTIN SCH MG: 100 CAPSULE ORAL at 21:30

## 2018-12-25 RX ADMIN — CARISOPRODOL SCH MG: 350 TABLET ORAL at 08:25

## 2018-12-25 RX ADMIN — DICLOFENAC SODIUM SCH MG: 75 TABLET, DELAYED RELEASE ORAL at 08:24

## 2018-12-25 RX ADMIN — METHOCARBAMOL PRN MG: 500 TABLET ORAL at 01:02

## 2018-12-25 RX ADMIN — SODIUM CHLORIDE, PRESERVATIVE FREE SCH ML: 5 INJECTION INTRAVENOUS at 08:26

## 2018-12-25 RX ADMIN — FERROUS SULFATE TAB 325 MG (65 MG ELEMENTAL FE) SCH MG: 325 (65 FE) TAB at 08:24

## 2018-12-25 RX ADMIN — GABAPENTIN SCH MG: 100 CAPSULE ORAL at 08:25

## 2018-12-25 RX ADMIN — ONDANSETRON PRN MG: 4 TABLET, ORALLY DISINTEGRATING ORAL at 08:26

## 2018-12-25 RX ADMIN — OXYCODONE HYDROCHLORIDE PRN MG: 5 TABLET ORAL at 18:48

## 2018-12-25 RX ADMIN — OXYCODONE HYDROCHLORIDE PRN MG: 5 TABLET ORAL at 11:00

## 2018-12-25 RX ADMIN — ASPIRIN SCH MG: 81 TABLET, COATED ORAL at 21:30

## 2018-12-25 RX ADMIN — OXYCODONE HYDROCHLORIDE PRN MG: 5 TABLET ORAL at 14:55

## 2018-12-25 RX ADMIN — SODIUM CHLORIDE, PRESERVATIVE FREE SCH ML: 5 INJECTION INTRAVENOUS at 21:30

## 2018-12-25 RX ADMIN — ONDANSETRON PRN MG: 4 TABLET, ORALLY DISINTEGRATING ORAL at 21:30

## 2018-12-25 RX ADMIN — DOCUSATE SODIUM 50MG AND SENNOSIDES 8.6MG SCH TAB: 8.6; 5 TABLET, FILM COATED ORAL at 08:24

## 2018-12-25 RX ADMIN — METHOCARBAMOL PRN MG: 500 TABLET ORAL at 19:29

## 2018-12-25 RX ADMIN — ASPIRIN SCH MG: 81 TABLET, COATED ORAL at 08:24

## 2018-12-25 RX ADMIN — LIDOCAINE SCH PATCH: 50 PATCH CUTANEOUS at 21:30

## 2018-12-25 RX ADMIN — OXYCODONE HYDROCHLORIDE PRN MG: 5 TABLET ORAL at 23:08

## 2018-12-25 RX ADMIN — METHOCARBAMOL PRN MG: 500 TABLET ORAL at 07:08

## 2018-12-26 VITALS — SYSTOLIC BLOOD PRESSURE: 105 MMHG | DIASTOLIC BLOOD PRESSURE: 69 MMHG

## 2018-12-26 VITALS — DIASTOLIC BLOOD PRESSURE: 67 MMHG | SYSTOLIC BLOOD PRESSURE: 105 MMHG

## 2018-12-26 VITALS — SYSTOLIC BLOOD PRESSURE: 110 MMHG | DIASTOLIC BLOOD PRESSURE: 75 MMHG

## 2018-12-26 VITALS — SYSTOLIC BLOOD PRESSURE: 120 MMHG | DIASTOLIC BLOOD PRESSURE: 72 MMHG

## 2018-12-26 LAB
<PLATELET ESTIMATE>: (no result)
<PLT MORPHOLOGY>: (no result)
ANISOCYTOSIS BLD QL SMEAR: (no result)
EOS#(MANUAL): 0.43 X10^3/UL (ref 0–0.4)
EOS% (MANUAL): 4 % (ref 1–7)
ERYTHROCYTE [DISTWIDTH] IN BLOOD BY AUTOMATED COUNT: 29.1 % (ref 9.6–15.2)
HYPOCHROMIA BLD QL SMEAR: (no result)
LYMPH#(MANUAL): 3.35 X10^3/UL (ref 1–3.4)
LYMPHS% (MANUAL): 31 % (ref 22–44)
MACROCYTES BLD QL SMEAR: (no result)
MCH RBC QN AUTO: 20.7 PG (ref 27–34.8)
MCHC RBC AUTO-ENTMCNC: 30.2 G/DL (ref 32.4–35.8)
MCV RBC AUTO: 68.8 FL (ref 80–100)
MD: YES
MICROCYTES BLD QL SMEAR: (no result)
MONOS#(MANUAL): 0.43 X10^3/UL (ref 0.3–2.7)
MONOS% (MANUAL): 4 % (ref 2–9)
PLATELET # BLD AUTO: 770 X10^3/UL (ref 130–400)
PMV BLD AUTO: 8.2 FL (ref 7.4–10.4)
POLYCHROMASIA BLD QL SMEAR: (no result)
RBC # BLD AUTO: 4.22 X10^6/UL (ref 3.82–5.3)
SEG#(MANUAL): 6.59 X10^3/UL (ref 1.8–6.8)
SEGS% (MANUAL): 61 % (ref 42–75)

## 2018-12-26 PROCEDURE — 0SP90JZ REMOVAL OF SYNTHETIC SUBSTITUTE FROM RIGHT HIP JOINT, OPEN APPROACH: ICD-10-PCS | Performed by: ORTHOPAEDIC SURGERY

## 2018-12-26 PROCEDURE — 0SR90JA REPLACEMENT OF RIGHT HIP JOINT WITH SYNTHETIC SUBSTITUTE, UNCEMENTED, OPEN APPROACH: ICD-10-PCS | Performed by: ORTHOPAEDIC SURGERY

## 2018-12-26 RX ADMIN — ACETAMINOPHEN SCH MG: 500 TABLET, COATED ORAL at 20:58

## 2018-12-26 RX ADMIN — LIDOCAINE SCH PATCH: 50 PATCH CUTANEOUS at 20:58

## 2018-12-26 RX ADMIN — ONDANSETRON PRN MG: 4 TABLET, ORALLY DISINTEGRATING ORAL at 07:50

## 2018-12-26 RX ADMIN — ONDANSETRON PRN MG: 4 TABLET, ORALLY DISINTEGRATING ORAL at 21:08

## 2018-12-26 RX ADMIN — DICLOFENAC SODIUM SCH MG: 75 TABLET, DELAYED RELEASE ORAL at 20:58

## 2018-12-26 RX ADMIN — DOCUSATE SODIUM 50MG AND SENNOSIDES 8.6MG SCH TAB: 8.6; 5 TABLET, FILM COATED ORAL at 07:55

## 2018-12-26 RX ADMIN — GABAPENTIN SCH MG: 100 CAPSULE ORAL at 07:55

## 2018-12-26 RX ADMIN — HYDROMORPHONE HYDROCHLORIDE PRN MG: 2 INJECTION INTRAMUSCULAR; INTRAVENOUS; SUBCUTANEOUS at 19:05

## 2018-12-26 RX ADMIN — METHOCARBAMOL PRN MG: 500 TABLET ORAL at 03:20

## 2018-12-26 RX ADMIN — FERROUS SULFATE TAB 325 MG (65 MG ELEMENTAL FE) SCH MG: 325 (65 FE) TAB at 07:55

## 2018-12-26 RX ADMIN — OXYCODONE HYDROCHLORIDE PRN MG: 5 TABLET ORAL at 03:20

## 2018-12-26 RX ADMIN — GABAPENTIN SCH MG: 100 CAPSULE ORAL at 20:58

## 2018-12-26 RX ADMIN — OXYCODONE HYDROCHLORIDE PRN MG: 5 TABLET ORAL at 23:59

## 2018-12-26 RX ADMIN — HYDROMORPHONE HYDROCHLORIDE PRN MG: 2 INJECTION INTRAMUSCULAR; INTRAVENOUS; SUBCUTANEOUS at 19:00

## 2018-12-26 RX ADMIN — GABAPENTIN SCH MG: 100 CAPSULE ORAL at 16:00

## 2018-12-26 RX ADMIN — METHOCARBAMOL PRN MG: 500 TABLET ORAL at 09:42

## 2018-12-26 RX ADMIN — CARISOPRODOL SCH MG: 350 TABLET ORAL at 07:55

## 2018-12-26 RX ADMIN — OXYCODONE HYDROCHLORIDE PRN MG: 5 TABLET ORAL at 11:50

## 2018-12-26 RX ADMIN — OXYCODONE HYDROCHLORIDE PRN MG: 5 TABLET ORAL at 07:55

## 2018-12-26 RX ADMIN — CARISOPRODOL SCH MG: 350 TABLET ORAL at 20:59

## 2018-12-26 RX ADMIN — FERROUS SULFATE TAB 325 MG (65 MG ELEMENTAL FE) SCH MG: 325 (65 FE) TAB at 17:00

## 2018-12-26 RX ADMIN — SODIUM CHLORIDE, PRESERVATIVE FREE SCH ML: 5 INJECTION INTRAVENOUS at 07:56

## 2018-12-26 RX ADMIN — HYDROMORPHONE HYDROCHLORIDE PRN MG: 1 INJECTION, SOLUTION INTRAMUSCULAR; INTRAVENOUS; SUBCUTANEOUS at 23:09

## 2018-12-26 RX ADMIN — ASPIRIN SCH MG: 81 TABLET, COATED ORAL at 07:56

## 2018-12-26 RX ADMIN — DICLOFENAC SODIUM SCH MG: 75 TABLET, DELAYED RELEASE ORAL at 07:55

## 2018-12-26 RX ADMIN — ASPIRIN SCH MG: 81 TABLET, COATED ORAL at 20:59

## 2018-12-26 RX ADMIN — SODIUM CHLORIDE, PRESERVATIVE FREE SCH ML: 5 INJECTION INTRAVENOUS at 20:59

## 2018-12-27 VITALS — DIASTOLIC BLOOD PRESSURE: 57 MMHG | SYSTOLIC BLOOD PRESSURE: 105 MMHG

## 2018-12-27 VITALS — DIASTOLIC BLOOD PRESSURE: 74 MMHG | SYSTOLIC BLOOD PRESSURE: 121 MMHG

## 2018-12-27 VITALS — DIASTOLIC BLOOD PRESSURE: 65 MMHG | SYSTOLIC BLOOD PRESSURE: 100 MMHG

## 2018-12-27 VITALS — DIASTOLIC BLOOD PRESSURE: 67 MMHG | SYSTOLIC BLOOD PRESSURE: 99 MMHG

## 2018-12-27 VITALS — SYSTOLIC BLOOD PRESSURE: 94 MMHG | DIASTOLIC BLOOD PRESSURE: 38 MMHG

## 2018-12-27 LAB
<PLATELET ESTIMATE>: (no result)
<PLT MORPHOLOGY>: (no result)
ANION GAP SERPL CALC-SCNC: 6 MMOL/L (ref 5–15)
ANISOCYTOSIS BLD QL SMEAR: (no result)
CALCIUM SERPL-MCNC: 7.3 MG/DL (ref 8.5–10.1)
CHLORIDE SERPL-SCNC: 105 MMOL/L (ref 98–107)
CREAT SERPL-MCNC: 0.6 MG/DL (ref 0.55–1.02)
EOS#(MANUAL): 0.12 X10^3/UL (ref 0–0.4)
EOS% (MANUAL): 1 % (ref 1–7)
ERYTHROCYTE [DISTWIDTH] IN BLOOD BY AUTOMATED COUNT: 29.4 % (ref 9.6–15.2)
HYPOCHROMIA BLD QL SMEAR: (no result)
LYMPH#(MANUAL): 2.42 X10^3/UL (ref 1–3.4)
LYMPHS% (MANUAL): 21 % (ref 22–44)
MCH RBC QN AUTO: 21.1 PG (ref 27–34.8)
MCHC RBC AUTO-ENTMCNC: 30.7 G/DL (ref 32.4–35.8)
MCV RBC AUTO: 68.7 FL (ref 80–100)
MD: YES
MICROCYTES BLD QL SMEAR: (no result)
MONOS#(MANUAL): 1.15 X10^3/UL (ref 0.3–2.7)
MONOS% (MANUAL): 10 % (ref 2–9)
PLATELET # BLD AUTO: 817 X10^3/UL (ref 130–400)
PMV BLD AUTO: 8.2 FL (ref 7.4–10.4)
POLYCHROMASIA BLD QL SMEAR: (no result)
RBC # BLD AUTO: 3.95 X10^6/UL (ref 3.82–5.3)
SEG#(MANUAL): 7.82 X10^3/UL (ref 1.8–6.8)
SEGS% (MANUAL): 68 % (ref 42–75)

## 2018-12-27 RX ADMIN — SODIUM CHLORIDE, PRESERVATIVE FREE SCH ML: 5 INJECTION INTRAVENOUS at 09:06

## 2018-12-27 RX ADMIN — OXYCODONE HYDROCHLORIDE PRN MG: 5 TABLET ORAL at 11:37

## 2018-12-27 RX ADMIN — OXYCODONE HYDROCHLORIDE PRN MG: 5 TABLET ORAL at 14:04

## 2018-12-27 RX ADMIN — GABAPENTIN SCH MG: 100 CAPSULE ORAL at 09:07

## 2018-12-27 RX ADMIN — HYDROMORPHONE HYDROCHLORIDE PRN MG: 1 INJECTION, SOLUTION INTRAMUSCULAR; INTRAVENOUS; SUBCUTANEOUS at 07:07

## 2018-12-27 RX ADMIN — METHOCARBAMOL PRN MG: 500 TABLET ORAL at 21:10

## 2018-12-27 RX ADMIN — OXYCODONE HYDROCHLORIDE PRN MG: 5 TABLET ORAL at 08:01

## 2018-12-27 RX ADMIN — DICLOFENAC SODIUM SCH MG: 75 TABLET, DELAYED RELEASE ORAL at 09:06

## 2018-12-27 RX ADMIN — OXYCODONE HYDROCHLORIDE PRN MG: 5 TABLET ORAL at 04:04

## 2018-12-27 RX ADMIN — DICLOFENAC SODIUM SCH MG: 75 TABLET, DELAYED RELEASE ORAL at 20:04

## 2018-12-27 RX ADMIN — FERROUS SULFATE TAB 325 MG (65 MG ELEMENTAL FE) SCH MG: 325 (65 FE) TAB at 16:58

## 2018-12-27 RX ADMIN — CEFAZOLIN SCH MLS/HR: 1 INJECTION, POWDER, FOR SOLUTION INTRAVENOUS at 09:06

## 2018-12-27 RX ADMIN — FERROUS SULFATE TAB 325 MG (65 MG ELEMENTAL FE) SCH MG: 325 (65 FE) TAB at 08:01

## 2018-12-27 RX ADMIN — LIDOCAINE SCH PATCH: 50 PATCH CUTANEOUS at 20:04

## 2018-12-27 RX ADMIN — OXYCODONE HYDROCHLORIDE PRN MG: 5 TABLET ORAL at 20:04

## 2018-12-27 RX ADMIN — ACETAMINOPHEN SCH MG: 500 TABLET, COATED ORAL at 20:04

## 2018-12-27 RX ADMIN — CARISOPRODOL SCH MG: 350 TABLET ORAL at 20:05

## 2018-12-27 RX ADMIN — ACETAMINOPHEN SCH MG: 500 TABLET, COATED ORAL at 09:06

## 2018-12-27 RX ADMIN — METHOCARBAMOL PRN MG: 500 TABLET ORAL at 01:25

## 2018-12-27 RX ADMIN — ONDANSETRON PRN MG: 4 TABLET, ORALLY DISINTEGRATING ORAL at 09:06

## 2018-12-27 RX ADMIN — HYDROMORPHONE HYDROCHLORIDE PRN MG: 1 INJECTION, SOLUTION INTRAMUSCULAR; INTRAVENOUS; SUBCUTANEOUS at 18:53

## 2018-12-27 RX ADMIN — CARISOPRODOL SCH MG: 350 TABLET ORAL at 09:07

## 2018-12-27 RX ADMIN — GABAPENTIN SCH MG: 300 CAPSULE ORAL at 20:05

## 2018-12-27 RX ADMIN — GABAPENTIN SCH MG: 300 CAPSULE ORAL at 16:08

## 2018-12-27 RX ADMIN — ONDANSETRON PRN MG: 4 TABLET, ORALLY DISINTEGRATING ORAL at 16:08

## 2018-12-27 RX ADMIN — METHOCARBAMOL PRN MG: 500 TABLET ORAL at 07:35

## 2018-12-27 RX ADMIN — HYDROMORPHONE HYDROCHLORIDE PRN MG: 1 INJECTION, SOLUTION INTRAMUSCULAR; INTRAVENOUS; SUBCUTANEOUS at 11:02

## 2018-12-27 RX ADMIN — METHOCARBAMOL PRN MG: 500 TABLET ORAL at 13:35

## 2018-12-27 RX ADMIN — ASPIRIN SCH MG: 81 TABLET, COATED ORAL at 20:04

## 2018-12-27 RX ADMIN — HYDROMORPHONE HYDROCHLORIDE PRN MG: 1 INJECTION, SOLUTION INTRAMUSCULAR; INTRAVENOUS; SUBCUTANEOUS at 15:01

## 2018-12-27 RX ADMIN — OXYCODONE HYDROCHLORIDE PRN MG: 5 TABLET ORAL at 23:48

## 2018-12-27 RX ADMIN — DOCUSATE SODIUM 50MG AND SENNOSIDES 8.6MG SCH TAB: 8.6; 5 TABLET, FILM COATED ORAL at 09:07

## 2018-12-27 RX ADMIN — SODIUM CHLORIDE, PRESERVATIVE FREE SCH ML: 5 INJECTION INTRAVENOUS at 20:09

## 2018-12-27 RX ADMIN — HYDROMORPHONE HYDROCHLORIDE PRN MG: 1 INJECTION, SOLUTION INTRAMUSCULAR; INTRAVENOUS; SUBCUTANEOUS at 03:10

## 2018-12-27 RX ADMIN — CEFAZOLIN SCH MLS/HR: 1 INJECTION, POWDER, FOR SOLUTION INTRAVENOUS at 01:25

## 2018-12-27 RX ADMIN — ASPIRIN SCH MG: 81 TABLET, COATED ORAL at 09:07

## 2018-12-27 RX ADMIN — ACETAMINOPHEN SCH MG: 500 TABLET, COATED ORAL at 16:08

## 2018-12-27 RX ADMIN — OXYCODONE HYDROCHLORIDE PRN MG: 5 TABLET ORAL at 16:58

## 2018-12-28 VITALS — SYSTOLIC BLOOD PRESSURE: 107 MMHG | DIASTOLIC BLOOD PRESSURE: 66 MMHG

## 2018-12-28 VITALS — SYSTOLIC BLOOD PRESSURE: 96 MMHG | DIASTOLIC BLOOD PRESSURE: 61 MMHG

## 2018-12-28 VITALS — SYSTOLIC BLOOD PRESSURE: 113 MMHG | DIASTOLIC BLOOD PRESSURE: 70 MMHG

## 2018-12-28 LAB
<PLATELET ESTIMATE>: (no result)
<PLT MORPHOLOGY>: (no result)
ALBUMIN SERPL-MCNC: 2.6 G/DL (ref 3.4–5)
ALP SERPL-CCNC: 172 U/L (ref 45–117)
ALT SERPL-CCNC: 219 U/L (ref 12–78)
ANION GAP SERPL CALC-SCNC: 7 MMOL/L (ref 5–15)
ANISOCYTOSIS BLD QL SMEAR: (no result)
BILIRUB SERPL-MCNC: 0.2 MG/DL (ref 0.2–1)
CALCIUM SERPL-MCNC: 7.4 MG/DL (ref 8.5–10.1)
CHLORIDE SERPL-SCNC: 105 MMOL/L (ref 98–107)
CREAT SERPL-MCNC: 0.52 MG/DL (ref 0.55–1.02)
EOS#(MANUAL): 0.33 X10^3/UL (ref 0–0.4)
EOS% (MANUAL): 3 % (ref 1–7)
ERYTHROCYTE [DISTWIDTH] IN BLOOD BY AUTOMATED COUNT: 31.1 % (ref 9.6–15.2)
HYPOCHROMIA BLD QL SMEAR: (no result)
LYMPH#(MANUAL): 1.53 X10^3/UL (ref 1–3.4)
LYMPHS% (MANUAL): 14 % (ref 22–44)
MACROCYTES BLD QL SMEAR: (no result)
MCH RBC QN AUTO: 20.9 PG (ref 27–34.8)
MCHC RBC AUTO-ENTMCNC: 29.7 G/DL (ref 32.4–35.8)
MCV RBC AUTO: 70.3 FL (ref 80–100)
MD: YES
MICROCYTES BLD QL SMEAR: (no result)
MONOS#(MANUAL): 1.31 X10^3/UL (ref 0.3–2.7)
MONOS% (MANUAL): 12 % (ref 2–9)
PLATELET # BLD AUTO: 784 X10^3/UL (ref 130–400)
PMV BLD AUTO: 9 FL (ref 7.4–10.4)
POLYCHROMASIA BLD QL SMEAR: (no result)
PROT SERPL-MCNC: 6.5 G/DL (ref 6.4–8.2)
RBC # BLD AUTO: 4.07 X10^6/UL (ref 3.82–5.3)
SEG#(MANUAL): 7.74 X10^3/UL (ref 1.8–6.8)
SEGS% (MANUAL): 71 % (ref 42–75)

## 2018-12-28 RX ADMIN — DOCUSATE SODIUM 50MG AND SENNOSIDES 8.6MG SCH TAB: 8.6; 5 TABLET, FILM COATED ORAL at 09:09

## 2018-12-28 RX ADMIN — SODIUM CHLORIDE, PRESERVATIVE FREE SCH ML: 5 INJECTION INTRAVENOUS at 09:10

## 2018-12-28 RX ADMIN — METHOCARBAMOL PRN MG: 500 TABLET ORAL at 14:48

## 2018-12-28 RX ADMIN — HYDROMORPHONE HYDROCHLORIDE PRN MG: 1 INJECTION, SOLUTION INTRAMUSCULAR; INTRAVENOUS; SUBCUTANEOUS at 11:02

## 2018-12-28 RX ADMIN — HYDROMORPHONE HYDROCHLORIDE PRN MG: 1 INJECTION, SOLUTION INTRAMUSCULAR; INTRAVENOUS; SUBCUTANEOUS at 05:47

## 2018-12-28 RX ADMIN — GABAPENTIN SCH MG: 300 CAPSULE ORAL at 09:10

## 2018-12-28 RX ADMIN — ACETAMINOPHEN SCH MG: 500 TABLET, COATED ORAL at 09:10

## 2018-12-28 RX ADMIN — FERROUS SULFATE TAB 325 MG (65 MG ELEMENTAL FE) SCH MG: 325 (65 FE) TAB at 07:56

## 2018-12-28 RX ADMIN — METHOCARBAMOL PRN MG: 500 TABLET ORAL at 03:15

## 2018-12-28 RX ADMIN — ASPIRIN SCH MG: 81 TABLET, COATED ORAL at 09:10

## 2018-12-28 RX ADMIN — ONDANSETRON PRN MG: 4 TABLET, ORALLY DISINTEGRATING ORAL at 09:09

## 2018-12-28 RX ADMIN — OXYCODONE HYDROCHLORIDE PRN MG: 5 TABLET ORAL at 09:31

## 2018-12-28 RX ADMIN — DICLOFENAC SODIUM SCH MG: 75 TABLET, DELAYED RELEASE ORAL at 09:10

## 2018-12-28 RX ADMIN — OXYCODONE HYDROCHLORIDE PRN MG: 5 TABLET ORAL at 06:25

## 2018-12-28 RX ADMIN — HYDROMORPHONE HYDROCHLORIDE PRN MG: 1 INJECTION, SOLUTION INTRAMUSCULAR; INTRAVENOUS; SUBCUTANEOUS at 01:36

## 2018-12-28 RX ADMIN — CARISOPRODOL SCH MG: 350 TABLET ORAL at 09:10

## 2018-12-28 RX ADMIN — OXYCODONE HYDROCHLORIDE PRN MG: 5 TABLET ORAL at 12:26

## 2018-12-28 RX ADMIN — OXYCODONE HYDROCHLORIDE PRN MG: 5 TABLET ORAL at 03:15

## 2018-12-28 RX ADMIN — METHOCARBAMOL PRN MG: 500 TABLET ORAL at 09:20

## 2019-07-11 ENCOUNTER — HOSPITAL ENCOUNTER (OUTPATIENT)
Dept: HOSPITAL 8 - STAR | Age: 44
Discharge: HOME | End: 2019-07-11
Attending: ORTHOPAEDIC SURGERY
Payer: COMMERCIAL

## 2019-07-11 DIAGNOSIS — M76.11: ICD-10-CM

## 2019-07-11 DIAGNOSIS — M70.71: ICD-10-CM

## 2019-07-11 DIAGNOSIS — Z01.818: Primary | ICD-10-CM

## 2019-07-11 LAB
BASOPHILS # BLD AUTO: 0.05 X10^3/UL (ref 0–0.1)
BASOPHILS NFR BLD AUTO: 1 % (ref 0–1)
EOSINOPHIL # BLD AUTO: 0.38 X10^3/UL (ref 0–0.4)
EOSINOPHIL NFR BLD AUTO: 7 % (ref 1–7)
ERYTHROCYTE [DISTWIDTH] IN BLOOD BY AUTOMATED COUNT: 19.7 % (ref 9.6–15.2)
LYMPHOCYTES # BLD AUTO: 2.19 X10^3/UL (ref 1–3.4)
LYMPHOCYTES NFR BLD AUTO: 40 % (ref 22–44)
MCH RBC QN AUTO: 24.4 PG (ref 27–34.8)
MCHC RBC AUTO-ENTMCNC: 31.2 G/DL (ref 32.4–35.8)
MCV RBC AUTO: 78.3 FL (ref 80–100)
MD: NO
MONOCYTES # BLD AUTO: 0.64 X10^3/UL (ref 0.2–0.8)
MONOCYTES NFR BLD AUTO: 11 % (ref 2–9)
NEUTROPHILS # BLD AUTO: 2.29 X10^3/UL (ref 1.8–6.8)
NEUTROPHILS NFR BLD AUTO: 41 % (ref 42–75)
PLATELET # BLD AUTO: 325 X10^3/UL (ref 130–400)
PMV BLD AUTO: 8.4 FL (ref 7.4–10.4)
RBC # BLD AUTO: 4.54 X10^6/UL (ref 3.82–5.3)

## 2019-07-11 PROCEDURE — 36415 COLL VENOUS BLD VENIPUNCTURE: CPT

## 2019-07-11 PROCEDURE — 85025 COMPLETE CBC W/AUTO DIFF WBC: CPT

## 2019-07-16 ENCOUNTER — HOSPITAL ENCOUNTER (OUTPATIENT)
Dept: HOSPITAL 8 - OUT | Age: 44
Discharge: HOME | End: 2019-07-16
Attending: ORTHOPAEDIC SURGERY
Payer: COMMERCIAL

## 2019-07-16 VITALS — DIASTOLIC BLOOD PRESSURE: 66 MMHG | SYSTOLIC BLOOD PRESSURE: 141 MMHG

## 2019-07-16 VITALS — WEIGHT: 257.5 LBS | BODY MASS INDEX: 41.38 KG/M2 | HEIGHT: 66 IN

## 2019-07-16 DIAGNOSIS — E66.01: ICD-10-CM

## 2019-07-16 DIAGNOSIS — Z82.49: ICD-10-CM

## 2019-07-16 DIAGNOSIS — M65.851: ICD-10-CM

## 2019-07-16 DIAGNOSIS — Z82.61: ICD-10-CM

## 2019-07-16 DIAGNOSIS — M76.11: Primary | ICD-10-CM

## 2019-07-16 DIAGNOSIS — Z88.8: ICD-10-CM

## 2019-07-16 DIAGNOSIS — Z82.3: ICD-10-CM

## 2019-07-16 PROCEDURE — 73501 X-RAY EXAM HIP UNI 1 VIEW: CPT

## 2019-07-16 PROCEDURE — 81025 URINE PREGNANCY TEST: CPT

## 2019-07-16 PROCEDURE — 76000 FLUOROSCOPY <1 HR PHYS/QHP: CPT

## 2019-07-16 PROCEDURE — 29999 UNLISTED PX ARTHROSCOPY: CPT

## 2019-08-09 ENCOUNTER — HOSPITAL ENCOUNTER (OUTPATIENT)
Dept: HOSPITAL 8 - CFH | Age: 44
Discharge: HOME | End: 2019-08-09
Attending: FAMILY MEDICINE
Payer: COMMERCIAL

## 2019-08-09 DIAGNOSIS — M47.26: ICD-10-CM

## 2019-08-09 DIAGNOSIS — M51.16: ICD-10-CM

## 2019-08-09 DIAGNOSIS — M48.061: Primary | ICD-10-CM

## 2019-08-09 PROCEDURE — 72148 MRI LUMBAR SPINE W/O DYE: CPT

## 2020-10-07 ENCOUNTER — HOSPITAL ENCOUNTER (OUTPATIENT)
Dept: HOSPITAL 8 - CFH | Age: 45
Discharge: HOME | End: 2020-10-07
Attending: RADIOLOGY
Payer: COMMERCIAL

## 2020-10-07 DIAGNOSIS — M96.1: ICD-10-CM

## 2020-10-07 DIAGNOSIS — M47.817: Primary | ICD-10-CM

## 2020-10-07 DIAGNOSIS — M51.36: ICD-10-CM

## 2020-10-07 PROCEDURE — 72146 MRI CHEST SPINE W/O DYE: CPT

## 2020-10-07 PROCEDURE — 72148 MRI LUMBAR SPINE W/O DYE: CPT

## 2021-04-02 ENCOUNTER — HOSPITAL ENCOUNTER (EMERGENCY)
Dept: HOSPITAL 8 - ED | Age: 46
Discharge: HOME | End: 2021-04-02
Payer: COMMERCIAL

## 2021-04-02 VITALS — BODY MASS INDEX: 39.86 KG/M2 | HEIGHT: 66 IN | WEIGHT: 248.02 LBS

## 2021-04-02 VITALS — SYSTOLIC BLOOD PRESSURE: 111 MMHG | DIASTOLIC BLOOD PRESSURE: 64 MMHG

## 2021-04-02 DIAGNOSIS — E66.9: ICD-10-CM

## 2021-04-02 DIAGNOSIS — R10.12: ICD-10-CM

## 2021-04-02 DIAGNOSIS — Z88.5: ICD-10-CM

## 2021-04-02 DIAGNOSIS — R10.13: ICD-10-CM

## 2021-04-02 DIAGNOSIS — D53.9: ICD-10-CM

## 2021-04-02 DIAGNOSIS — R10.11: Primary | ICD-10-CM

## 2021-04-02 LAB
<PLATELET ESTIMATE>: (no result)
<PLT MORPHOLOGY>: (no result)
ALBUMIN SERPL-MCNC: 4.2 G/DL (ref 3.4–5)
ALP SERPL-CCNC: 77 U/L (ref 45–117)
ALT SERPL-CCNC: 21 U/L (ref 12–78)
ANION GAP SERPL CALC-SCNC: 6 MMOL/L (ref 5–15)
ANISOCYTOSIS BLD QL SMEAR: (no result)
BASOPHILS NFR BLD MANUAL: 2 % (ref 0–1)
BASOS#(MANUAL): 0.16 X10^3/UL (ref 0–0.1)
BILIRUB SERPL-MCNC: 0.3 MG/DL (ref 0.2–1)
CALCIUM SERPL-MCNC: 9 MG/DL (ref 8.5–10.1)
CHLORIDE SERPL-SCNC: 105 MMOL/L (ref 98–107)
CREAT SERPL-MCNC: 0.65 MG/DL (ref 0.55–1.02)
EOS#(MANUAL): 0.16 X10^3/UL (ref 0–0.4)
EOS% (MANUAL): 2 % (ref 1–7)
ERYTHROCYTE [DISTWIDTH] IN BLOOD BY AUTOMATED COUNT: 21.8 % (ref 9.6–15.2)
HEMOGRAM NOTE: (no result)
HYPOCHROMIA BLD QL SMEAR: (no result)
LYMPH#(MANUAL): 1.76 X10^3/UL (ref 1–3.4)
LYMPHS% (MANUAL): 22 % (ref 22–44)
MCH RBC QN AUTO: 16.7 PG (ref 27–34.8)
MCHC RBC AUTO-ENTMCNC: 29.7 G/DL (ref 32.4–35.8)
MD: YES
MICROCYTES BLD QL SMEAR: (no result)
MICROSCOPIC: (no result)
MONOS#(MANUAL): 0.64 X10^3/UL (ref 0.3–2.7)
MONOS% (MANUAL): 8 % (ref 2–9)
OVALOCYTES BLD QL SMEAR: (no result)
PLATELET # BLD AUTO: 706 X10^3/UL (ref 130–400)
PMV BLD AUTO: 8.7 FL (ref 7.4–10.4)
POLYCHROMASIA BLD QL SMEAR: (no result)
PROT SERPL-MCNC: 8.5 G/DL (ref 6.4–8.2)
RBC # BLD AUTO: 4.42 X10^6/UL (ref 3.82–5.3)
SEG#(MANUAL): 5.28 X10^3/UL (ref 1.8–6.8)
SEGS% (MANUAL): 66 % (ref 42–75)

## 2021-04-02 PROCEDURE — 85025 COMPLETE CBC W/AUTO DIFF WBC: CPT

## 2021-04-02 PROCEDURE — 80053 COMPREHEN METABOLIC PANEL: CPT

## 2021-04-02 PROCEDURE — 81003 URINALYSIS AUTO W/O SCOPE: CPT

## 2021-04-02 PROCEDURE — 76700 US EXAM ABDOM COMPLETE: CPT

## 2021-04-02 PROCEDURE — 83690 ASSAY OF LIPASE: CPT

## 2021-04-02 PROCEDURE — 36415 COLL VENOUS BLD VENIPUNCTURE: CPT

## 2021-04-02 PROCEDURE — 84703 CHORIONIC GONADOTROPIN ASSAY: CPT

## 2021-04-02 PROCEDURE — 99284 EMERGENCY DEPT VISIT MOD MDM: CPT

## 2021-04-02 NOTE — NUR
PT C/O UPPER ABD PAIN AND NAUSEA. PT STATES SHE ALSO HAS BEEN HAVING PERIODIC 
SWELLING IN HER LOWER EXT'S. PT IS GOING THROUGH MENOPAUSE AND HAS BEEN 
EXPERIENCING ERRATIC PERIODS AND SWEATING. Attending

## 2021-04-12 ENCOUNTER — HOSPITAL ENCOUNTER (OUTPATIENT)
Dept: HOSPITAL 8 - CFH | Age: 46
Discharge: HOME | End: 2021-04-12
Attending: SURGERY
Payer: COMMERCIAL

## 2021-04-12 DIAGNOSIS — D73.89: Primary | ICD-10-CM

## 2021-04-12 DIAGNOSIS — R10.9: ICD-10-CM

## 2021-04-12 PROCEDURE — 74177 CT ABD & PELVIS W/CONTRAST: CPT

## 2021-05-19 ENCOUNTER — HOSPITAL ENCOUNTER (OUTPATIENT)
Dept: HOSPITAL 8 - RAD | Age: 46
Discharge: HOME | End: 2021-05-19
Attending: SURGERY
Payer: COMMERCIAL

## 2021-05-19 DIAGNOSIS — R10.9: Primary | ICD-10-CM

## 2021-05-19 DIAGNOSIS — Z96.641: ICD-10-CM

## 2021-05-19 DIAGNOSIS — Z98.84: ICD-10-CM

## 2021-05-19 PROCEDURE — 74240 X-RAY XM UPR GI TRC 1CNTRST: CPT

## 2024-09-23 ENCOUNTER — HOSPITAL ENCOUNTER (OUTPATIENT)
Dept: LAB | Facility: MEDICAL CENTER | Age: 49
End: 2024-09-23
Attending: INTERNAL MEDICINE
Payer: COMMERCIAL

## 2024-09-23 LAB
ANISOCYTOSIS BLD QL SMEAR: ABNORMAL
BASOPHILS # BLD AUTO: 2.6 % (ref 0–1.8)
BASOPHILS # BLD: 0.11 K/UL (ref 0–0.12)
EOSINOPHIL # BLD AUTO: 0.14 K/UL (ref 0–0.51)
EOSINOPHIL NFR BLD: 3.4 % (ref 0–6.9)
ERYTHROCYTE [DISTWIDTH] IN BLOOD BY AUTOMATED COUNT: 44 FL (ref 35.9–50)
HCT VFR BLD AUTO: 28.4 % (ref 37–47)
HGB BLD-MCNC: 7.8 G/DL (ref 12–16)
HYPOCHROMIA BLD QL SMEAR: ABNORMAL
IRON SATN MFR SERPL: 4 % (ref 15–55)
IRON SERPL-MCNC: 15 UG/DL (ref 40–170)
LYMPHOCYTES # BLD AUTO: 1.83 K/UL (ref 1–4.8)
LYMPHOCYTES NFR BLD: 44.7 % (ref 22–41)
MANUAL DIFF BLD: NORMAL
MCH RBC QN AUTO: 17.1 PG (ref 27–33)
MCHC RBC AUTO-ENTMCNC: 27.5 G/DL (ref 32.2–35.5)
MCV RBC AUTO: 62.3 FL (ref 81.4–97.8)
MICROCYTES BLD QL SMEAR: ABNORMAL
MONOCYTES # BLD AUTO: 0.32 K/UL (ref 0–0.85)
MONOCYTES NFR BLD AUTO: 7.8 % (ref 0–13.4)
MORPHOLOGY BLD-IMP: NORMAL
NEUTROPHILS # BLD AUTO: 1.66 K/UL (ref 1.82–7.42)
NEUTROPHILS NFR BLD: 40.5 % (ref 44–72)
NRBC # BLD AUTO: 0 K/UL
NRBC BLD-RTO: 0 /100 WBC (ref 0–0.2)
OVALOCYTES BLD QL SMEAR: NORMAL
PLATELET # BLD AUTO: 454 K/UL (ref 164–446)
PLATELET BLD QL SMEAR: NORMAL
PMV BLD AUTO: 9.8 FL (ref 9–12.9)
POIKILOCYTOSIS BLD QL SMEAR: NORMAL
RBC # BLD AUTO: 4.56 M/UL (ref 4.2–5.4)
RBC BLD AUTO: PRESENT
SCHISTOCYTES BLD QL SMEAR: NORMAL
STOMATOCYTES BLD QL SMEAR: NORMAL
TARGETS BLD QL SMEAR: NORMAL
TIBC SERPL-MCNC: 381 UG/DL (ref 250–450)
UIBC SERPL-MCNC: 366 UG/DL (ref 110–370)
WBC # BLD AUTO: 4.1 K/UL (ref 4.8–10.8)

## 2024-09-23 PROCEDURE — 82728 ASSAY OF FERRITIN: CPT

## 2024-09-23 PROCEDURE — 83540 ASSAY OF IRON: CPT

## 2024-09-23 PROCEDURE — 36415 COLL VENOUS BLD VENIPUNCTURE: CPT

## 2024-09-23 PROCEDURE — 85027 COMPLETE CBC AUTOMATED: CPT

## 2024-09-23 PROCEDURE — 83550 IRON BINDING TEST: CPT

## 2024-09-23 PROCEDURE — 85007 BL SMEAR W/DIFF WBC COUNT: CPT

## 2024-09-24 LAB — FERRITIN SERPL-MCNC: 3.5 NG/ML (ref 10–291)

## 2024-10-08 ENCOUNTER — APPOINTMENT (OUTPATIENT)
Dept: RADIOLOGY | Facility: MEDICAL CENTER | Age: 49
End: 2024-10-08
Attending: EMERGENCY MEDICINE
Payer: COMMERCIAL

## 2024-10-08 ENCOUNTER — HOSPITAL ENCOUNTER (EMERGENCY)
Facility: MEDICAL CENTER | Age: 49
End: 2024-10-08
Attending: EMERGENCY MEDICINE
Payer: COMMERCIAL

## 2024-10-08 VITALS
TEMPERATURE: 64.4 F | RESPIRATION RATE: 18 BRPM | DIASTOLIC BLOOD PRESSURE: 56 MMHG | SYSTOLIC BLOOD PRESSURE: 123 MMHG | HEIGHT: 66 IN | OXYGEN SATURATION: 99 % | HEART RATE: 67 BPM | WEIGHT: 234.57 LBS | BODY MASS INDEX: 37.7 KG/M2

## 2024-10-08 DIAGNOSIS — S60.222A CONTUSION OF LEFT HAND, INITIAL ENCOUNTER: ICD-10-CM

## 2024-10-08 DIAGNOSIS — S00.81XA ABRASION OF FACE, INITIAL ENCOUNTER: ICD-10-CM

## 2024-10-08 PROCEDURE — 99284 EMERGENCY DEPT VISIT MOD MDM: CPT

## 2024-10-08 PROCEDURE — 73130 X-RAY EXAM OF HAND: CPT | Mod: LT

## 2024-10-08 ASSESSMENT — PAIN DESCRIPTION - PAIN TYPE: TYPE: ACUTE PAIN

## 2024-10-22 ENCOUNTER — HOSPITAL ENCOUNTER (OUTPATIENT)
Dept: LAB | Facility: MEDICAL CENTER | Age: 49
End: 2024-10-22
Attending: INTERNAL MEDICINE
Payer: COMMERCIAL

## 2024-10-22 LAB
ANISOCYTOSIS BLD QL SMEAR: ABNORMAL
BASOPHILS # BLD AUTO: 0.4 % (ref 0–1.8)
BASOPHILS # BLD: 0.03 K/UL (ref 0–0.12)
COMMENT 1642: NORMAL
EOSINOPHIL # BLD AUTO: 0.13 K/UL (ref 0–0.51)
EOSINOPHIL NFR BLD: 1.8 % (ref 0–6.9)
ERYTHROCYTE [DISTWIDTH] IN BLOOD BY AUTOMATED COUNT: 45.4 FL (ref 35.9–50)
HCT VFR BLD AUTO: 27.1 % (ref 37–47)
HGB BLD-MCNC: 7.3 G/DL (ref 12–16)
HYPOCHROMIA BLD QL SMEAR: ABNORMAL
IMM GRANULOCYTES # BLD AUTO: 0.02 K/UL (ref 0–0.11)
IMM GRANULOCYTES NFR BLD AUTO: 0.3 % (ref 0–0.9)
LYMPHOCYTES # BLD AUTO: 1.98 K/UL (ref 1–4.8)
LYMPHOCYTES NFR BLD: 27 % (ref 22–41)
MCH RBC QN AUTO: 16.6 PG (ref 27–33)
MCHC RBC AUTO-ENTMCNC: 26.9 G/DL (ref 32.2–35.5)
MCV RBC AUTO: 61.5 FL (ref 81.4–97.8)
MICROCYTES BLD QL SMEAR: ABNORMAL
MONOCYTES # BLD AUTO: 0.58 K/UL (ref 0–0.85)
MONOCYTES NFR BLD AUTO: 7.9 % (ref 0–13.4)
MORPHOLOGY BLD-IMP: NORMAL
NEUTROPHILS # BLD AUTO: 4.6 K/UL (ref 1.82–7.42)
NEUTROPHILS NFR BLD: 62.6 % (ref 44–72)
NRBC # BLD AUTO: 0 K/UL
NRBC BLD-RTO: 0 /100 WBC (ref 0–0.2)
OVALOCYTES BLD QL SMEAR: NORMAL
PLATELET # BLD AUTO: 500 K/UL (ref 164–446)
PLATELET BLD QL SMEAR: NORMAL
PMV BLD AUTO: 9.6 FL (ref 9–12.9)
POIKILOCYTOSIS BLD QL SMEAR: NORMAL
RBC # BLD AUTO: 4.41 M/UL (ref 4.2–5.4)
RBC BLD AUTO: PRESENT
SCHISTOCYTES BLD QL SMEAR: NORMAL
WBC # BLD AUTO: 7.3 K/UL (ref 4.8–10.8)

## 2024-10-22 PROCEDURE — 36415 COLL VENOUS BLD VENIPUNCTURE: CPT

## 2024-10-22 PROCEDURE — 85025 COMPLETE CBC W/AUTO DIFF WBC: CPT

## 2024-10-27 ENCOUNTER — OUTPATIENT INFUSION SERVICES (OUTPATIENT)
Dept: ONCOLOGY | Facility: MEDICAL CENTER | Age: 49
End: 2024-10-27
Attending: INTERNAL MEDICINE
Payer: COMMERCIAL

## 2024-10-27 VITALS
HEIGHT: 64 IN | SYSTOLIC BLOOD PRESSURE: 107 MMHG | OXYGEN SATURATION: 98 % | DIASTOLIC BLOOD PRESSURE: 60 MMHG | TEMPERATURE: 97.5 F | BODY MASS INDEX: 39.11 KG/M2 | RESPIRATION RATE: 18 BRPM | WEIGHT: 229.06 LBS | HEART RATE: 62 BPM

## 2024-10-27 DIAGNOSIS — D50.8 OTHER IRON DEFICIENCY ANEMIA: ICD-10-CM

## 2024-10-27 LAB
FERRITIN SERPL-MCNC: 19.6 NG/ML (ref 10–291)
IRON SATN MFR SERPL: 3 % (ref 15–55)
IRON SERPL-MCNC: 11 UG/DL (ref 40–170)
TIBC SERPL-MCNC: 334 UG/DL (ref 250–450)
UIBC SERPL-MCNC: 323 UG/DL (ref 110–370)

## 2024-10-27 PROCEDURE — 96365 THER/PROPH/DIAG IV INF INIT: CPT

## 2024-10-27 PROCEDURE — 83550 IRON BINDING TEST: CPT

## 2024-10-27 PROCEDURE — 700105 HCHG RX REV CODE 258: Performed by: INTERNAL MEDICINE

## 2024-10-27 PROCEDURE — 83540 ASSAY OF IRON: CPT

## 2024-10-27 PROCEDURE — 82728 ASSAY OF FERRITIN: CPT

## 2024-10-27 PROCEDURE — 700111 HCHG RX REV CODE 636 W/ 250 OVERRIDE (IP): Mod: JZ | Performed by: INTERNAL MEDICINE

## 2024-10-27 RX ORDER — 0.9 % SODIUM CHLORIDE 0.9 %
10 VIAL (ML) INJECTION PRN
OUTPATIENT
Start: 2024-11-03

## 2024-10-27 RX ORDER — DIPHENHYDRAMINE HYDROCHLORIDE 50 MG/ML
50 INJECTION INTRAMUSCULAR; INTRAVENOUS PRN
OUTPATIENT
Start: 2024-11-03

## 2024-10-27 RX ORDER — 0.9 % SODIUM CHLORIDE 0.9 %
3 VIAL (ML) INJECTION PRN
OUTPATIENT
Start: 2024-11-03

## 2024-10-27 RX ORDER — EPINEPHRINE 1 MG/ML(1)
0.5 AMPUL (ML) INJECTION PRN
OUTPATIENT
Start: 2024-11-03

## 2024-10-27 RX ORDER — ALBUTEROL SULFATE 90 UG/1
2 INHALANT RESPIRATORY (INHALATION) PRN
OUTPATIENT
Start: 2024-11-03

## 2024-10-27 RX ORDER — 0.9 % SODIUM CHLORIDE 0.9 %
VIAL (ML) INJECTION PRN
OUTPATIENT
Start: 2024-11-03

## 2024-10-27 RX ORDER — SODIUM CHLORIDE 9 MG/ML
INJECTION, SOLUTION INTRAVENOUS CONTINUOUS
OUTPATIENT
Start: 2024-11-03

## 2024-10-27 RX ORDER — METHYLPREDNISOLONE SODIUM SUCCINATE 125 MG/2ML
125 INJECTION, POWDER, LYOPHILIZED, FOR SOLUTION INTRAMUSCULAR; INTRAVENOUS PRN
OUTPATIENT
Start: 2024-11-03

## 2024-10-27 RX ADMIN — FERUMOXYTOL 510 MG: 510 INJECTION INTRAVENOUS at 09:42

## 2024-10-27 ASSESSMENT — PAIN DESCRIPTION - PAIN TYPE: TYPE: CHRONIC PAIN

## 2024-11-03 ENCOUNTER — OUTPATIENT INFUSION SERVICES (OUTPATIENT)
Dept: ONCOLOGY | Facility: MEDICAL CENTER | Age: 49
End: 2024-11-03
Attending: INTERNAL MEDICINE
Payer: COMMERCIAL

## 2024-11-03 VITALS
HEART RATE: 77 BPM | SYSTOLIC BLOOD PRESSURE: 142 MMHG | OXYGEN SATURATION: 99 % | HEIGHT: 64 IN | RESPIRATION RATE: 18 BRPM | DIASTOLIC BLOOD PRESSURE: 59 MMHG | WEIGHT: 228.4 LBS | BODY MASS INDEX: 38.99 KG/M2 | TEMPERATURE: 97.4 F

## 2024-11-03 DIAGNOSIS — D50.8 OTHER IRON DEFICIENCY ANEMIA: ICD-10-CM

## 2024-11-03 PROCEDURE — 700102 HCHG RX REV CODE 250 W/ 637 OVERRIDE(OP): Performed by: INTERNAL MEDICINE

## 2024-11-03 PROCEDURE — 700105 HCHG RX REV CODE 258: Performed by: INTERNAL MEDICINE

## 2024-11-03 PROCEDURE — 700111 HCHG RX REV CODE 636 W/ 250 OVERRIDE (IP): Mod: JZ | Performed by: INTERNAL MEDICINE

## 2024-11-03 PROCEDURE — 96365 THER/PROPH/DIAG IV INF INIT: CPT

## 2024-11-03 PROCEDURE — A9270 NON-COVERED ITEM OR SERVICE: HCPCS | Performed by: INTERNAL MEDICINE

## 2024-11-03 RX ORDER — METHYLPREDNISOLONE SODIUM SUCCINATE 125 MG/2ML
125 INJECTION, POWDER, LYOPHILIZED, FOR SOLUTION INTRAMUSCULAR; INTRAVENOUS PRN
Status: CANCELLED | OUTPATIENT
Start: 2024-11-03

## 2024-11-03 RX ORDER — SODIUM CHLORIDE 9 MG/ML
INJECTION, SOLUTION INTRAVENOUS CONTINUOUS
Status: CANCELLED | OUTPATIENT
Start: 2024-11-03

## 2024-11-03 RX ORDER — 0.9 % SODIUM CHLORIDE 0.9 %
3 VIAL (ML) INJECTION PRN
Status: CANCELLED | OUTPATIENT
Start: 2024-11-03

## 2024-11-03 RX ORDER — DIPHENHYDRAMINE HYDROCHLORIDE 50 MG/ML
50 INJECTION INTRAMUSCULAR; INTRAVENOUS PRN
Status: CANCELLED | OUTPATIENT
Start: 2024-11-03

## 2024-11-03 RX ORDER — EPINEPHRINE 1 MG/ML(1)
0.5 AMPUL (ML) INJECTION PRN
Status: CANCELLED | OUTPATIENT
Start: 2024-11-03

## 2024-11-03 RX ORDER — ALBUTEROL SULFATE 90 UG/1
2 INHALANT RESPIRATORY (INHALATION) PRN
Status: CANCELLED | OUTPATIENT
Start: 2024-11-03

## 2024-11-03 RX ORDER — EPINEPHRINE 1 MG/ML(1)
0.5 AMPUL (ML) INJECTION PRN
Status: DISCONTINUED | OUTPATIENT
Start: 2024-11-03 | End: 2024-11-03

## 2024-11-03 RX ORDER — 0.9 % SODIUM CHLORIDE 0.9 %
VIAL (ML) INJECTION PRN
Status: CANCELLED | OUTPATIENT
Start: 2024-11-03

## 2024-11-03 RX ORDER — DIPHENHYDRAMINE HYDROCHLORIDE 50 MG/ML
50 INJECTION INTRAMUSCULAR; INTRAVENOUS PRN
Status: DISCONTINUED | OUTPATIENT
Start: 2024-11-03 | End: 2024-11-03

## 2024-11-03 RX ORDER — 0.9 % SODIUM CHLORIDE 0.9 %
10 VIAL (ML) INJECTION PRN
Status: CANCELLED | OUTPATIENT
Start: 2024-11-03

## 2024-11-03 RX ORDER — ALBUTEROL SULFATE 90 UG/1
2 INHALANT RESPIRATORY (INHALATION) PRN
Status: DISCONTINUED | OUTPATIENT
Start: 2024-11-03 | End: 2024-11-03

## 2024-11-03 RX ORDER — METHYLPREDNISOLONE SODIUM SUCCINATE 125 MG/2ML
125 INJECTION, POWDER, LYOPHILIZED, FOR SOLUTION INTRAMUSCULAR; INTRAVENOUS PRN
Status: DISCONTINUED | OUTPATIENT
Start: 2024-11-03 | End: 2024-11-03

## 2024-11-03 RX ADMIN — FERUMOXYTOL 510 MG: 510 INJECTION INTRAVENOUS at 15:59

## 2024-11-03 ASSESSMENT — PAIN DESCRIPTION - PAIN TYPE: TYPE: CHRONIC PAIN

## 2024-11-04 NOTE — PROGRESS NOTES
Pt presented for D 2 of 2 of weekly Feraheme. POC discussed and she verbalized understanding, reports she did well last week after her Feraheme. PIV placed without difficulty and pt tolerated well. Brisk blood return noted, line flushes with ease. Feraheme administered over 30 mins per MAR with 30 mins observation after completion. Pt tolerated well. No s/s of adverse reactions or complications noted. PIV flushed, removed with tip intact and site covered with sterile gauze/coban. Follow-up care discussed and pt confirms she will be following up with ordering provider for continued POC and future orders. Pt confirms understanding when to contact provider for symptoms/concerns. Pt discharged ambulatory in NAD.

## 2024-11-26 ENCOUNTER — HOSPITAL ENCOUNTER (OUTPATIENT)
Dept: LAB | Facility: MEDICAL CENTER | Age: 49
End: 2024-11-26
Attending: INTERNAL MEDICINE
Payer: COMMERCIAL

## 2024-11-26 LAB
ANISOCYTOSIS BLD QL SMEAR: ABNORMAL
BASOPHILS # BLD AUTO: 0.9 % (ref 0–1.8)
BASOPHILS # BLD: 0.05 K/UL (ref 0–0.12)
COMMENT 1642: NORMAL
EOSINOPHIL # BLD AUTO: 0.1 K/UL (ref 0–0.51)
EOSINOPHIL NFR BLD: 1.8 % (ref 0–6.9)
FERRITIN SERPL-MCNC: 33.3 NG/ML (ref 10–291)
HCT VFR BLD AUTO: 34.7 % (ref 37–47)
HGB BLD-MCNC: 10.2 G/DL (ref 12–16)
HYPOCHROMIA BLD QL SMEAR: ABNORMAL
IMM GRANULOCYTES # BLD AUTO: 0.01 K/UL (ref 0–0.11)
IMM GRANULOCYTES NFR BLD AUTO: 0.2 % (ref 0–0.9)
IRON SATN MFR SERPL: 7 % (ref 15–55)
IRON SERPL-MCNC: 21 UG/DL (ref 40–170)
LYMPHOCYTES # BLD AUTO: 2.37 K/UL (ref 1–4.8)
LYMPHOCYTES NFR BLD: 42 % (ref 22–41)
MACROCYTES BLD QL SMEAR: ABNORMAL
MCH RBC QN AUTO: 21.8 PG (ref 27–33)
MCHC RBC AUTO-ENTMCNC: 29.4 G/DL (ref 32.2–35.5)
MCV RBC AUTO: 74.1 FL (ref 81.4–97.8)
MICROCYTES BLD QL SMEAR: ABNORMAL
MONOCYTES # BLD AUTO: 0.62 K/UL (ref 0–0.85)
MONOCYTES NFR BLD AUTO: 11 % (ref 0–13.4)
MORPHOLOGY BLD-IMP: NORMAL
NEUTROPHILS # BLD AUTO: 2.49 K/UL (ref 1.82–7.42)
NEUTROPHILS NFR BLD: 44.1 % (ref 44–72)
NRBC # BLD AUTO: 0.02 K/UL
NRBC BLD-RTO: 0.4 /100 WBC (ref 0–0.2)
PLATELET # BLD AUTO: 377 K/UL (ref 164–446)
PLATELET BLD QL SMEAR: NORMAL
PMV BLD AUTO: 10 FL (ref 9–12.9)
RBC # BLD AUTO: 4.68 M/UL (ref 4.2–5.4)
RBC BLD AUTO: PRESENT
TIBC SERPL-MCNC: 320 UG/DL (ref 250–450)
UIBC SERPL-MCNC: 299 UG/DL (ref 110–370)
WBC # BLD AUTO: 5.6 K/UL (ref 4.8–10.8)

## 2024-11-26 PROCEDURE — 83550 IRON BINDING TEST: CPT

## 2024-11-26 PROCEDURE — 82728 ASSAY OF FERRITIN: CPT

## 2024-11-26 PROCEDURE — 36415 COLL VENOUS BLD VENIPUNCTURE: CPT

## 2024-11-26 PROCEDURE — 85025 COMPLETE CBC W/AUTO DIFF WBC: CPT

## 2024-11-26 PROCEDURE — 83540 ASSAY OF IRON: CPT

## 2025-01-20 ENCOUNTER — TELEPHONE (OUTPATIENT)
Dept: OBGYN | Facility: CLINIC | Age: 50
End: 2025-01-20
Payer: COMMERCIAL

## 2025-01-20 NOTE — TELEPHONE ENCOUNTER
Caller Name: Norma Vuong   Call Back Number: [phone]    How would the patient prefer to be contacted with a response: Phone call OK to leave a detailed message    Pt called 1/17/25 and left VM at 4:27 pm with the following:    Hi I'm calling to see if I can get some answer to a procedure I need to get done, my number is [phone]. I have to get an MRI done and I've had many failed ones or have tried to get it done within the last year, but have been unsuccessful due to hip damage, I need to get the MRI done under anesthesia or something. I had hip surgery in 2018 and have been in chronic pain since. I need to get approvals to be seen for the MRI and I'm not sure if you office can do the approval or not. I constantly bleed put and have had about x8 iron transfusions and I'm practically anemic, I kid you not my levels are at 2.9 I'm serious. I've been on the verge of blood transfusions. My uterus is too big and I need to get the MRI done. My current OBGYN told me to go to pain management (saw pt has pain pump through her chart) and they looked at me crossed eyed, and your Gyn should be able to give you the answers on it. All of this has been done within [facility], it's all in there. So I need to get another OBGYN that can can possibly approve be undergoing an MRI under anesthesia. Sorry this was a lot and I'm not even a patient at your office.

## 2025-01-29 ENCOUNTER — HOSPITAL ENCOUNTER (OUTPATIENT)
Dept: LAB | Facility: MEDICAL CENTER | Age: 50
End: 2025-01-29
Attending: INTERNAL MEDICINE
Payer: COMMERCIAL

## 2025-01-29 LAB
BASOPHILS # BLD AUTO: 1.2 % (ref 0–1.8)
BASOPHILS # BLD: 0.06 K/UL (ref 0–0.12)
EOSINOPHIL # BLD AUTO: 0.15 K/UL (ref 0–0.51)
EOSINOPHIL NFR BLD: 3.1 % (ref 0–6.9)
ERYTHROCYTE [DISTWIDTH] IN BLOOD BY AUTOMATED COUNT: 51.4 FL (ref 35.9–50)
FERRITIN SERPL-MCNC: 8.2 NG/ML (ref 10–291)
HCT VFR BLD AUTO: 30.8 % (ref 37–47)
HGB BLD-MCNC: 9.6 G/DL (ref 12–16)
IMM GRANULOCYTES # BLD AUTO: 0.01 K/UL (ref 0–0.11)
IMM GRANULOCYTES NFR BLD AUTO: 0.2 % (ref 0–0.9)
IRON SATN MFR SERPL: 4 % (ref 15–55)
IRON SERPL-MCNC: 15 UG/DL (ref 40–170)
LYMPHOCYTES # BLD AUTO: 1.79 K/UL (ref 1–4.8)
LYMPHOCYTES NFR BLD: 36.7 % (ref 22–41)
MCH RBC QN AUTO: 23.1 PG (ref 27–33)
MCHC RBC AUTO-ENTMCNC: 31.2 G/DL (ref 32.2–35.5)
MCV RBC AUTO: 74 FL (ref 81.4–97.8)
MONOCYTES # BLD AUTO: 0.56 K/UL (ref 0–0.85)
MONOCYTES NFR BLD AUTO: 11.5 % (ref 0–13.4)
NEUTROPHILS # BLD AUTO: 2.31 K/UL (ref 1.82–7.42)
NEUTROPHILS NFR BLD: 47.3 % (ref 44–72)
NRBC # BLD AUTO: 0 K/UL
NRBC BLD-RTO: 0 /100 WBC (ref 0–0.2)
PLATELET # BLD AUTO: 418 K/UL (ref 164–446)
PMV BLD AUTO: 9.6 FL (ref 9–12.9)
RBC # BLD AUTO: 4.16 M/UL (ref 4.2–5.4)
TIBC SERPL-MCNC: 370 UG/DL (ref 250–450)
UIBC SERPL-MCNC: 355 UG/DL (ref 110–370)
WBC # BLD AUTO: 4.9 K/UL (ref 4.8–10.8)

## 2025-01-29 PROCEDURE — 36415 COLL VENOUS BLD VENIPUNCTURE: CPT

## 2025-01-29 PROCEDURE — 83550 IRON BINDING TEST: CPT

## 2025-01-29 PROCEDURE — 82728 ASSAY OF FERRITIN: CPT

## 2025-01-29 PROCEDURE — 85025 COMPLETE CBC W/AUTO DIFF WBC: CPT

## 2025-01-29 PROCEDURE — 83540 ASSAY OF IRON: CPT

## 2025-02-24 ENCOUNTER — GYNECOLOGY VISIT (OUTPATIENT)
Dept: OBGYN | Facility: CLINIC | Age: 50
End: 2025-02-24
Payer: COMMERCIAL

## 2025-02-24 VITALS
BODY MASS INDEX: 37.46 KG/M2 | SYSTOLIC BLOOD PRESSURE: 125 MMHG | DIASTOLIC BLOOD PRESSURE: 69 MMHG | WEIGHT: 233.1 LBS | HEIGHT: 66 IN

## 2025-02-24 DIAGNOSIS — D21.9 LEIOMYOMA: ICD-10-CM

## 2025-02-24 DIAGNOSIS — N92.1 MENORRHAGIA WITH IRREGULAR CYCLE: ICD-10-CM

## 2025-02-24 DIAGNOSIS — E66.9 OBESITY (BMI 30-39.9): ICD-10-CM

## 2025-02-24 DIAGNOSIS — D50.0 IRON DEFICIENCY ANEMIA DUE TO CHRONIC BLOOD LOSS: ICD-10-CM

## 2025-02-24 NOTE — PROGRESS NOTES
New GYN Problem Note    CC:   New Patient and Gynecologic Exam      HPI:   Patient is a 49 y.o.  who presents complaining of irregular menstrual bleeding over the last year.  The patient has been followed by an OB/GYN in [place] and had an endometrial biopsy performed approximately 3 months ago.  She is requesting a follow-up MRI.  She reports that her menses are very heavy lasting a week or 2 every 2 to 3 weeks.  The symptoms have worsened over the last year.  Previously her periods were normal.  She has a history of iron deficiency anemia that has been managed by her PCP.  Her previous MRIs have revealed enlarged uterus of approximately 15 x 11 by 8 cm as well as a 3 to 4 cm left ovarian cystic lesion.        GYNECOLOGIC HISTORY:     History of sexually transmitted diseases? No       Menstrual History  Patient's last menstrual period was No LMP recorded.       CONTRACEPTION:  None     PAP HISTORY:  Last Pap:   Hx Moderate or Severe Dysplasia : No     OBSTETRIC HISTORY:  OB History    Para Term  AB Living   0 0 0 0 0 0   SAB IAB Ectopic Molar Multiple Live Births   0 0 0 0 0 0       MEDICAL HISTORY:  Past Medical History:   Diagnosis Date    Allergy     Anemia     Anxiety     Arthritis     Chronic low back pain 2016    Chronic pain of right hip 2016    Chronic use of opiate for therapeutic purpose 2017       SURGICAL HISTORY:  Past Surgical History:   Procedure Laterality Date    HIP ARTHROSCOPY Right 2017    Labral tear    LAMINOTOMY      L4/5    CHOLECYSTECTOMY      GASTRIC BYPASS LAPAROSCOPIC      HIP ARTHROSCOPY      labral tear       FAMILY HISTORY:  Family History   Problem Relation Age of Onset    Arthritis Mother     Arthritis Father     No Known Problems Brother     Arthritis Maternal Grandmother     No Known Problems Maternal Grandfather     Cancer Paternal Grandmother         Hodgkins    Heart Attack Paternal Grandfather        MEDICATIONS:  Current  Outpatient Medications   Medication Sig    amoxicillin (AMOXIL) 250 MG Cap Take 1 Capsule by mouth every day.    amoxicillin (AMOXIL) 500 MG Cap Take 1 Capsule by mouth 2 times a day.    doxycycline (VIBRAMYCIN) 100 MG Cap Take 1 Capsule by mouth 2 times a day.    HYDROcodone-acetaminophen (NORCO) 5-325 MG Tab per tablet Take 1 Tablet by mouth every 6 hours as needed.    HYDROcodone-acetaminophen (NORCO) 7.5-325 MG tab Take 1 Tablet by mouth every 6 hours as needed.    amoxicillin (AMOXIL) 250 MG Cap Take 1 Capsule by mouth every day.    Doxycycline Hyclate 100 MG Tablet Delayed Response Take 1 Tablet by mouth every day.    HYDROcodone-acetaminophen (NORCO) 7.5-325 MG tab TAKE 1 TABLET BY MOUTH EVERY 6 HOURS AS NEEDED FOR PAIN FOR 30 DAYS. FILL 8/13/23    ondansetron (ZOFRAN) 4 MG Tab tablet Take 4 mg by mouth.    azithromycin (ZITHROMAX) 250 MG Tab  (Patient not taking: Reported on 2/24/2025)    baclofen (LIORESAL) 10 MG Tab  (Patient not taking: Reported on 11/3/2024)    benzonatate (TESSALON) 100 MG Cap  (Patient not taking: Reported on 2/24/2025)    carisoprodol (SOMA) 350 MG Tab  (Patient not taking: Reported on 11/3/2024)    cloNIDine (CATAPRES) 0.1 MG Tab  (Patient not taking: Reported on 2/24/2025)    fentaNYL (DURAGESIC) 12 MCG/HR PATCH 72 HR  (Patient not taking: Reported on 2/24/2025)    fentaNYL (DURAGESIC) 50 MCG/HR PATCH 72 HR  (Patient not taking: Reported on 2/24/2025)    hydrOXYzine pamoate (VISTARIL) 25 MG Cap Take 1 Capsule by mouth 2 times a day as needed. (Patient not taking: Reported on 2/24/2025)    hydrOXYzine pamoate (VISTARIL) 50 MG Cap Take 1 Capsule by mouth 2 times a day. (Patient not taking: Reported on 2/24/2025)    meloxicam (MOBIC) 7.5 MG Tab  (Patient not taking: Reported on 11/3/2024)    metaxalone (SKELAXIN) 800 MG Tab  (Patient not taking: Reported on 2/24/2025)    oxyCODONE immediate release (ROXICODONE) 10 MG immediate release tablet Take 1 Tablet by mouth 3 times a day as  needed. (Patient not taking: Reported on 2/24/2025)    oxyCODONE (OXY-IR) 30 MG immediate release tablet  (Patient not taking: Reported on 2/24/2025)    citalopram (CELEXA) 20 MG Tab Take 1 Tablet by mouth every day. (Patient not taking: Reported on 2/24/2025)    hydrOXYzine HCl (ATARAX) 25 MG Tab Take 1 Tablet by mouth 3 times a day as needed. (Patient not taking: Reported on 2/24/2025)    hydrOXYzine HCl (ATARAX) 25 MG Tab Take 1 Tablet by mouth 3 times a day as needed for Itching. (Patient not taking: Reported on 2/24/2025)    citalopram (CELEXA) 20 MG Tab Take 1 Tablet by mouth every day. (Patient not taking: Reported on 2/24/2025)    cetirizine (ZYRTEC) 10 MG Tab Take 1 Tab by mouth every day. (Patient not taking: Reported on 2/24/2025)       ALLERGIES / REACTIONS:  Allergies   Allergen Reactions    Duloxetine Hcl Swelling    Sulfasalazine Anaphylaxis    Cyclobenzaprine Unspecified     Other reaction(s): .Unknown      Gabapentin Unspecified    Lyrica Rash     rash    Methocarbamol     Sulfa Drugs Anaphylaxis    Versed Rash     rash    Aspirin Swelling     Other reaction(s): .Unknown    Codeine Swelling     Other reaction(s): .Unknown    Midazolam Unspecified and Rash     rash  rash  rash      Morphine Itching     Other reaction(s): .Unknown, Headache    Nsaids Vomiting    Pregabalin Rash and Vomiting     Other reaction(s): .Unknown  rash  rash      Tramadol Swelling     Other reaction(s): .Unknown  Other reaction(s): .Unknown         SOCIAL HISTORY:   reports that she has never smoked. She has never been exposed to tobacco smoke. She has never used smokeless tobacco. She reports that she does not drink alcohol and does not use drugs.    ROS:   Positive ROS: See HPI  Gen: no fevers or chills, no significant weight loss or gain, excessive fatigue  Respiratory:  no cough or dyspnea  Cardiac:  no chest pain, no palpitations, no syncope  Breast: no breast discharge, pain, lump or skin changes  GI:  no heartburn, no  "abdominal pain, no nausea or vomiting  Gyn: See HPI  Urinary: no dysuria, urgency, frequency, incontinence   Psych: no depression or anxiety  Neuro: no migraines with aura, fainting spells, numbness or tingling  Extremities: See past medical history       PHYSICAL EXAMINATION:  Vital Signs: /69 (BP Location: Left arm, Patient Position: Sitting, BP Cuff Size: Adult)   Ht 5' 6\"   Wt 233 lb 1.6 oz   BMI 37.62 kg/m²   Constitutional: The patient is well developed and well nourished.  Psychiatric: Patient is oriented to time place and person.   Abdomen: Soft, non-tender. Non distended  Pelvic Exam:     External female genitalia: normal appearance    Urethra - no lesions, no erythema    Vagina: moist, pink, normal ruggae    Cervix: pink, smooth, no lesions, no CMT    Uterus -enlarged, mildly tender approximately 16 weeks size    Ovaries: non-tender, no appreciable masses  Extremeties: Legs are symmetric and without tenderness. There is no edema present.    ASSESSMENT AND PLAN:  49 y.o.   woman who presents with a history of heavy menstrual bleeding Q 2 to 3 weeks.  This is likely secondary to oligoovulation and is likely perimenopausal at this point as well.  The patient was counseled that MRIs are not the best imaging for her problems and we will schedule her for a pelvic ultrasound evaluate whether she has any  structural abnormalities in her endometrial cavity such as polyps or pedunculated fibroids which may be exacerbating her menstrual cycles.  The patient was counseled regarding management options including potentially hysteroscopic endometrial ablation or IUD insertion to protect the endometrium.  She will follow-up after ultrasound to evaluate and review her findings.  She also potentially could be a candidate for GnRH agonist treatment versus uterine artery embolization.  Ultimately she is close to menopause which may take care of her symptoms.  She does have a benign endometrial biopsy within " the last 6 months by her previous OB/GYN.  Follow-up in 1 month after her ultrasound.  The patient was counseled to continue her iron supplementation.  Follow-up as needed, 1 month.    1. Menorrhagia with irregular cycle  - US-PELVIC COMPLETE (TRANSABDOMINAL/TRANSVAGINAL) (COMBO); Future    2. Iron deficiency anemia due to chronic blood loss  - US-PELVIC COMPLETE (TRANSABDOMINAL/TRANSVAGINAL) (COMBO); Future    3. Leiomyoma  - US-PELVIC COMPLETE (TRANSABDOMINAL/TRANSVAGINAL) (COMBO); Future    4. Obesity (BMI 30-39.9)            Stan Vyas M.D.

## 2025-02-24 NOTE — PROGRESS NOTES
Patient is new here to [facility] care.   Last pap done/results : 12/2024 WNL per pt   LMP : 2/16/2025 - exact, irreg cycles   BCM : none  Last Mammogram : 5/24/2018    Pt states she just needs approval with an OBGYN to get approved for an MRI with sedation for her uterus for excessive VB. She is currently getting MRIs done with [facility]. She states she also had an EMB at [facility] results were WNL.    Phone/Pharmacy verified   [phone]

## 2025-03-21 ENCOUNTER — HOSPITAL ENCOUNTER (OUTPATIENT)
Dept: LAB | Facility: MEDICAL CENTER | Age: 50
End: 2025-03-21
Attending: STUDENT IN AN ORGANIZED HEALTH CARE EDUCATION/TRAINING PROGRAM
Payer: COMMERCIAL

## 2025-03-21 LAB
ALBUMIN SERPL BCP-MCNC: 3.9 G/DL (ref 3.2–4.9)
ALBUMIN/GLOB SERPL: 1.2 G/DL
ALP SERPL-CCNC: 55 U/L (ref 30–99)
ALT SERPL-CCNC: 19 U/L (ref 2–50)
ANION GAP SERPL CALC-SCNC: 11 MMOL/L (ref 7–16)
ANISOCYTOSIS BLD QL SMEAR: ABNORMAL
AST SERPL-CCNC: 23 U/L (ref 12–45)
BASOPHILS # BLD AUTO: 0.9 % (ref 0–1.8)
BASOPHILS # BLD: 0.05 K/UL (ref 0–0.12)
BILIRUB SERPL-MCNC: 0.3 MG/DL (ref 0.1–1.5)
BUN SERPL-MCNC: 14 MG/DL (ref 8–22)
BURR CELLS BLD QL SMEAR: NORMAL
CALCIUM ALBUM COR SERPL-MCNC: 9.3 MG/DL (ref 8.5–10.5)
CALCIUM SERPL-MCNC: 9.2 MG/DL (ref 8.5–10.5)
CHLORIDE SERPL-SCNC: 105 MMOL/L (ref 96–112)
CO2 SERPL-SCNC: 23 MMOL/L (ref 20–33)
CREAT SERPL-MCNC: 0.62 MG/DL (ref 0.5–1.4)
CRP SERPL HS-MCNC: <0.3 MG/DL (ref 0–0.75)
EOSINOPHIL # BLD AUTO: 0 K/UL (ref 0–0.51)
EOSINOPHIL NFR BLD: 0 % (ref 0–6.9)
ERYTHROCYTE [SEDIMENTATION RATE] IN BLOOD BY WESTERGREN METHOD: 4 MM/HOUR (ref 0–25)
FASTING STATUS PATIENT QL REPORTED: NORMAL
GFR SERPLBLD CREATININE-BSD FMLA CKD-EPI: 108 ML/MIN/1.73 M 2
GLOBULIN SER CALC-MCNC: 3.2 G/DL (ref 1.9–3.5)
GLUCOSE SERPL-MCNC: 88 MG/DL (ref 65–99)
HCT VFR BLD AUTO: 39.5 % (ref 37–47)
HGB BLD-MCNC: 11.2 G/DL (ref 12–16)
HYPOCHROMIA BLD QL SMEAR: ABNORMAL
LYMPHOCYTES # BLD AUTO: 2.51 K/UL (ref 1–4.8)
LYMPHOCYTES NFR BLD: 47.4 % (ref 22–41)
MACROCYTES BLD QL SMEAR: ABNORMAL
MANUAL DIFF BLD: NORMAL
MCH RBC QN AUTO: 23 PG (ref 27–33)
MCHC RBC AUTO-ENTMCNC: 28.4 G/DL (ref 32.2–35.5)
MCV RBC AUTO: 81.3 FL (ref 81.4–97.8)
MICROCYTES BLD QL SMEAR: ABNORMAL
MONOCYTES # BLD AUTO: 0.69 K/UL (ref 0–0.85)
MONOCYTES NFR BLD AUTO: 13.1 % (ref 0–13.4)
MORPHOLOGY BLD-IMP: NORMAL
NEUTROPHILS # BLD AUTO: 2.05 K/UL (ref 1.82–7.42)
NEUTROPHILS NFR BLD: 38.6 % (ref 44–72)
NRBC # BLD AUTO: 0 K/UL
NRBC BLD-RTO: 0 /100 WBC (ref 0–0.2)
OVALOCYTES BLD QL SMEAR: NORMAL
PLATELET # BLD AUTO: 414 K/UL (ref 164–446)
PLATELET BLD QL SMEAR: NORMAL
PMV BLD AUTO: 10 FL (ref 9–12.9)
POIKILOCYTOSIS BLD QL SMEAR: NORMAL
POTASSIUM SERPL-SCNC: 4.3 MMOL/L (ref 3.6–5.5)
PROT SERPL-MCNC: 7.1 G/DL (ref 6–8.2)
RBC # BLD AUTO: 4.86 M/UL (ref 4.2–5.4)
RBC BLD AUTO: PRESENT
SODIUM SERPL-SCNC: 139 MMOL/L (ref 135–145)
WBC # BLD AUTO: 5.3 K/UL (ref 4.8–10.8)

## 2025-03-21 PROCEDURE — 85007 BL SMEAR W/DIFF WBC COUNT: CPT

## 2025-03-21 PROCEDURE — 85652 RBC SED RATE AUTOMATED: CPT

## 2025-03-21 PROCEDURE — 36415 COLL VENOUS BLD VENIPUNCTURE: CPT

## 2025-03-21 PROCEDURE — 85027 COMPLETE CBC AUTOMATED: CPT

## 2025-03-21 PROCEDURE — 86140 C-REACTIVE PROTEIN: CPT

## 2025-03-21 PROCEDURE — 80053 COMPREHEN METABOLIC PANEL: CPT

## 2025-04-01 ENCOUNTER — APPOINTMENT (OUTPATIENT)
Dept: OBGYN | Facility: CLINIC | Age: 50
End: 2025-04-01
Payer: COMMERCIAL

## 2025-04-14 ENCOUNTER — APPOINTMENT (OUTPATIENT)
Dept: OBGYN | Facility: CLINIC | Age: 50
End: 2025-04-14
Payer: COMMERCIAL

## 2025-04-30 ENCOUNTER — HOSPITAL ENCOUNTER (OUTPATIENT)
Dept: RADIOLOGY | Facility: MEDICAL CENTER | Age: 50
End: 2025-04-30
Attending: OBSTETRICS & GYNECOLOGY
Payer: COMMERCIAL

## 2025-04-30 DIAGNOSIS — D50.0 IRON DEFICIENCY ANEMIA DUE TO CHRONIC BLOOD LOSS: ICD-10-CM

## 2025-04-30 DIAGNOSIS — N92.1 MENORRHAGIA WITH IRREGULAR CYCLE: ICD-10-CM

## 2025-04-30 DIAGNOSIS — D21.9 LEIOMYOMA: ICD-10-CM

## 2025-04-30 PROCEDURE — 76830 TRANSVAGINAL US NON-OB: CPT

## 2025-05-05 ENCOUNTER — APPOINTMENT (OUTPATIENT)
Dept: OBGYN | Facility: CLINIC | Age: 50
End: 2025-05-05
Payer: COMMERCIAL

## 2025-05-05 VITALS
WEIGHT: 227.8 LBS | HEIGHT: 66 IN | DIASTOLIC BLOOD PRESSURE: 62 MMHG | BODY MASS INDEX: 36.61 KG/M2 | SYSTOLIC BLOOD PRESSURE: 102 MMHG

## 2025-05-05 DIAGNOSIS — N80.03 ADENOMYOSIS: ICD-10-CM

## 2025-05-05 DIAGNOSIS — D50.0 IRON DEFICIENCY ANEMIA DUE TO CHRONIC BLOOD LOSS: ICD-10-CM

## 2025-05-05 DIAGNOSIS — R11.0 NAUSEA: ICD-10-CM

## 2025-05-05 DIAGNOSIS — N92.1 MENORRHAGIA WITH IRREGULAR CYCLE: ICD-10-CM

## 2025-05-05 DIAGNOSIS — E66.9 OBESITY (BMI 30-39.9): ICD-10-CM

## 2025-05-05 PROCEDURE — 3074F SYST BP LT 130 MM HG: CPT | Performed by: OBSTETRICS & GYNECOLOGY

## 2025-05-05 PROCEDURE — 3078F DIAST BP <80 MM HG: CPT | Performed by: OBSTETRICS & GYNECOLOGY

## 2025-05-05 PROCEDURE — 99213 OFFICE O/P EST LOW 20 MIN: CPT | Performed by: OBSTETRICS & GYNECOLOGY

## 2025-05-05 RX ORDER — MEDROXYPROGESTERONE ACETATE 10 MG
10 TABLET ORAL
Qty: 30 TABLET | Refills: 0 | Status: SHIPPED | OUTPATIENT
Start: 2025-05-05

## 2025-05-05 RX ORDER — ONDANSETRON 4 MG/1
4 TABLET, FILM COATED ORAL EVERY 4 HOURS PRN
Qty: 20 TABLET | Refills: 2 | Status: SHIPPED | OUTPATIENT
Start: 2025-05-05 | End: 2025-05-19

## 2025-05-05 ASSESSMENT — FIBROSIS 4 INDEX: FIB4 SCORE: 0.64

## 2025-05-05 NOTE — PROGRESS NOTES
Patient here for GYN visit.   Last seen on : 2/24/25 with Asael for menorrhagia with irregular cycles, leiomyoma   LMP : 4/12/2025 - exact     Pt states her still heavy and painful that causes her nausea and wanted to ask about getting Zofran.   Pelvic US done 4/30 to be reviewed.     Phone/Pharmacy verified:  [phone]

## 2025-05-05 NOTE — PROGRESS NOTES
Gynecology return visit:    HPI: The patient is a 49-year-old G0 young woman who presents with a history of menorrhagia with dysmenorrhea.  The patient underwent an ultrasound to evaluate uterine fibroids or adenomyosis.  The patient continues to have irregular menstrual bleeding with the last episode lasting 9 days.  She is taking daily iron and requests Zofran for nausea from the iron.  The patient does report pain with her bleeding.    Past Medical History:   Diagnosis Date    Allergy     Anemia     Anxiety     Arthritis     Chronic low back pain 08/09/2016    Chronic pain of right hip 08/09/2016    Chronic use of opiate for therapeutic purpose 09/28/2017      Past Surgical History:   Procedure Laterality Date    HIP ARTHROSCOPY Right 2017    Labral tear    LAMINOTOMY  2006    L4/5    CHOLECYSTECTOMY      GASTRIC BYPASS LAPAROSCOPIC      HIP ARTHROSCOPY      labral tear     Objective: Moderately obese woman in no acute distress  Ultrasound was reviewed revealing a moderately enlarged uterus approximately 10 x 14 cm heterogeneous in appearance likely adenomyosis.  The endometrial lining was thin at 0.45 cm    Assessment/plan: The patient presents with a history of menorrhagia with dysmenorrhea as well as deficiency anemia.  Patient was counseled regarding management options including an intrauterine device, endometrial ablation versus hysterectomy.  The patient does have a complicated past surgical history and she has concerns regarding complications from previous surgeries.  After discussion of management options we will proceed with medical therapy using daily progesterone followed by withdrawal.  At that point we would place the Mirena IUD to suppress the endometrial lining.  We would follow-up as needed and consider surgical options such as an endometrial ablation versus hysterectomy if that was ineffective.  Patient was counseled regarding potential complications and side effects and she will follow-up in 2  weeks for an IUD placement.

## 2025-08-19 ENCOUNTER — OFFICE VISIT (OUTPATIENT)
Dept: OBGYN | Facility: CLINIC | Age: 50
End: 2025-08-19
Payer: COMMERCIAL

## 2025-08-19 VITALS
WEIGHT: 212.8 LBS | HEART RATE: 63 BPM | DIASTOLIC BLOOD PRESSURE: 73 MMHG | SYSTOLIC BLOOD PRESSURE: 114 MMHG | BODY MASS INDEX: 34.35 KG/M2

## 2025-08-19 DIAGNOSIS — R11.0 NAUSEA: Primary | ICD-10-CM

## 2025-08-19 DIAGNOSIS — D50.8 OTHER IRON DEFICIENCY ANEMIA: ICD-10-CM

## 2025-08-19 DIAGNOSIS — N92.1 MENORRHAGIA WITH IRREGULAR CYCLE: ICD-10-CM

## 2025-08-19 DIAGNOSIS — D50.0 IRON DEFICIENCY ANEMIA DUE TO CHRONIC BLOOD LOSS: ICD-10-CM

## 2025-08-19 PROCEDURE — 99213 OFFICE O/P EST LOW 20 MIN: CPT | Performed by: OBSTETRICS & GYNECOLOGY

## 2025-08-19 PROCEDURE — 3078F DIAST BP <80 MM HG: CPT | Performed by: OBSTETRICS & GYNECOLOGY

## 2025-08-19 PROCEDURE — 3074F SYST BP LT 130 MM HG: CPT | Performed by: OBSTETRICS & GYNECOLOGY

## 2025-08-19 RX ORDER — ONDANSETRON 4 MG/1
4 TABLET, FILM COATED ORAL EVERY 4 HOURS PRN
Qty: 20 TABLET | Refills: 1 | Status: SHIPPED | OUTPATIENT
Start: 2025-08-19

## 2025-08-19 RX ORDER — TRANEXAMIC ACID 650 MG/1
650 TABLET ORAL 3 TIMES DAILY
Qty: 30 TABLET | Refills: 0 | Status: SHIPPED | OUTPATIENT
Start: 2025-08-19 | End: 2025-08-24

## 2025-08-19 RX ORDER — MEDROXYPROGESTERONE ACETATE 10 MG
10 TABLET ORAL
Qty: 30 TABLET | Refills: 0 | Status: SHIPPED | OUTPATIENT
Start: 2025-08-19

## 2025-08-19 ASSESSMENT — FIBROSIS 4 INDEX: FIB4 SCORE: 0.64
